# Patient Record
Sex: FEMALE | Race: BLACK OR AFRICAN AMERICAN | NOT HISPANIC OR LATINO | URBAN - METROPOLITAN AREA
[De-identification: names, ages, dates, MRNs, and addresses within clinical notes are randomized per-mention and may not be internally consistent; named-entity substitution may affect disease eponyms.]

---

## 2023-07-23 ENCOUNTER — EMERGENCY (EMERGENCY)
Facility: HOSPITAL | Age: 58
LOS: 1 days | Discharge: HOME | End: 2023-07-23
Attending: STUDENT IN AN ORGANIZED HEALTH CARE EDUCATION/TRAINING PROGRAM | Admitting: STUDENT IN AN ORGANIZED HEALTH CARE EDUCATION/TRAINING PROGRAM
Payer: MEDICAID

## 2023-07-23 VITALS
RESPIRATION RATE: 17 BRPM | HEART RATE: 84 BPM | SYSTOLIC BLOOD PRESSURE: 152 MMHG | OXYGEN SATURATION: 100 % | TEMPERATURE: 98 F | DIASTOLIC BLOOD PRESSURE: 92 MMHG

## 2023-07-23 DIAGNOSIS — Z98.51 TUBAL LIGATION STATUS: Chronic | ICD-10-CM

## 2023-07-23 LAB
A1C WITH ESTIMATED AVERAGE GLUCOSE RESULT: 14.5 % — HIGH (ref 4–5.6)
ALBUMIN SERPL ELPH-MCNC: 4.3 G/DL — SIGNIFICANT CHANGE UP (ref 3.3–5)
ALBUMIN SERPL ELPH-MCNC: 4.7 G/DL — SIGNIFICANT CHANGE UP (ref 3.3–5)
ALP SERPL-CCNC: 107 U/L — SIGNIFICANT CHANGE UP (ref 40–120)
ALP SERPL-CCNC: 136 U/L — HIGH (ref 40–120)
ALT FLD-CCNC: 19 U/L — SIGNIFICANT CHANGE UP (ref 4–33)
ALT FLD-CCNC: 21 U/L — SIGNIFICANT CHANGE UP (ref 4–33)
ANION GAP SERPL CALC-SCNC: 13 MMOL/L — SIGNIFICANT CHANGE UP (ref 7–14)
ANION GAP SERPL CALC-SCNC: 17 MMOL/L — HIGH (ref 7–14)
APPEARANCE UR: CLEAR — SIGNIFICANT CHANGE UP
AST SERPL-CCNC: 22 U/L — SIGNIFICANT CHANGE UP (ref 4–32)
AST SERPL-CCNC: 32 U/L — SIGNIFICANT CHANGE UP (ref 4–32)
B PERT DNA SPEC QL NAA+PROBE: SIGNIFICANT CHANGE UP
B PERT+PARAPERT DNA PNL SPEC NAA+PROBE: SIGNIFICANT CHANGE UP
B-OH-BUTYR SERPL-SCNC: <0 MMOL/L — SIGNIFICANT CHANGE UP (ref 0–0.4)
BASE EXCESS BLDV CALC-SCNC: 7.6 MMOL/L — HIGH (ref -2–3)
BASOPHILS # BLD AUTO: 0 K/UL — SIGNIFICANT CHANGE UP (ref 0–0.2)
BASOPHILS NFR BLD AUTO: 0 % — SIGNIFICANT CHANGE UP (ref 0–2)
BILIRUB SERPL-MCNC: 0.3 MG/DL — SIGNIFICANT CHANGE UP (ref 0.2–1.2)
BILIRUB SERPL-MCNC: 0.4 MG/DL — SIGNIFICANT CHANGE UP (ref 0.2–1.2)
BILIRUB UR-MCNC: NEGATIVE — SIGNIFICANT CHANGE UP
BLOOD GAS VENOUS COMPREHENSIVE RESULT: SIGNIFICANT CHANGE UP
BLOOD GAS VENOUS COMPREHENSIVE RESULT: SIGNIFICANT CHANGE UP
BORDETELLA PARAPERTUSSIS (RAPRVP): SIGNIFICANT CHANGE UP
BUN SERPL-MCNC: 19 MG/DL — SIGNIFICANT CHANGE UP (ref 7–23)
BUN SERPL-MCNC: 21 MG/DL — SIGNIFICANT CHANGE UP (ref 7–23)
C PNEUM DNA SPEC QL NAA+PROBE: SIGNIFICANT CHANGE UP
CALCIUM SERPL-MCNC: 10 MG/DL — SIGNIFICANT CHANGE UP (ref 8.4–10.5)
CALCIUM SERPL-MCNC: 10.9 MG/DL — HIGH (ref 8.4–10.5)
CHLORIDE BLDV-SCNC: 93 MMOL/L — LOW (ref 96–108)
CHLORIDE SERPL-SCNC: 101 MMOL/L — SIGNIFICANT CHANGE UP (ref 98–107)
CHLORIDE SERPL-SCNC: 90 MMOL/L — LOW (ref 98–107)
CO2 BLDV-SCNC: 35.8 MMOL/L — HIGH (ref 22–26)
CO2 SERPL-SCNC: 27 MMOL/L — SIGNIFICANT CHANGE UP (ref 22–31)
CO2 SERPL-SCNC: 27 MMOL/L — SIGNIFICANT CHANGE UP (ref 22–31)
COLOR SPEC: YELLOW — SIGNIFICANT CHANGE UP
CREAT SERPL-MCNC: 0.69 MG/DL — SIGNIFICANT CHANGE UP (ref 0.5–1.3)
CREAT SERPL-MCNC: 0.83 MG/DL — SIGNIFICANT CHANGE UP (ref 0.5–1.3)
DIFF PNL FLD: NEGATIVE — SIGNIFICANT CHANGE UP
EGFR: 101 ML/MIN/1.73M2 — SIGNIFICANT CHANGE UP
EGFR: 82 ML/MIN/1.73M2 — SIGNIFICANT CHANGE UP
EOSINOPHIL # BLD AUTO: 0.11 K/UL — SIGNIFICANT CHANGE UP (ref 0–0.5)
EOSINOPHIL NFR BLD AUTO: 1.7 % — SIGNIFICANT CHANGE UP (ref 0–6)
ESTIMATED AVERAGE GLUCOSE: 369 — SIGNIFICANT CHANGE UP
FLUAV SUBTYP SPEC NAA+PROBE: SIGNIFICANT CHANGE UP
FLUBV RNA SPEC QL NAA+PROBE: SIGNIFICANT CHANGE UP
GAS PNL BLDV: 129 MMOL/L — LOW (ref 136–145)
GAS PNL BLDV: SIGNIFICANT CHANGE UP
GLUCOSE BLDV-MCNC: 573 MG/DL — CRITICAL HIGH (ref 70–99)
GLUCOSE SERPL-MCNC: 181 MG/DL — HIGH (ref 70–99)
GLUCOSE SERPL-MCNC: 528 MG/DL — CRITICAL HIGH (ref 70–99)
GLUCOSE UR QL: >=1000 MG/DL
HADV DNA SPEC QL NAA+PROBE: SIGNIFICANT CHANGE UP
HCO3 BLDV-SCNC: 34 MMOL/L — HIGH (ref 22–29)
HCOV 229E RNA SPEC QL NAA+PROBE: SIGNIFICANT CHANGE UP
HCOV HKU1 RNA SPEC QL NAA+PROBE: SIGNIFICANT CHANGE UP
HCOV NL63 RNA SPEC QL NAA+PROBE: SIGNIFICANT CHANGE UP
HCOV OC43 RNA SPEC QL NAA+PROBE: SIGNIFICANT CHANGE UP
HCT VFR BLD CALC: 43.8 % — SIGNIFICANT CHANGE UP (ref 34.5–45)
HCT VFR BLDA CALC: 38 % — SIGNIFICANT CHANGE UP (ref 34.5–46.5)
HGB BLD CALC-MCNC: 12.7 G/DL — SIGNIFICANT CHANGE UP (ref 11.7–16.1)
HGB BLD-MCNC: 13.2 G/DL — SIGNIFICANT CHANGE UP (ref 11.5–15.5)
HMPV RNA SPEC QL NAA+PROBE: SIGNIFICANT CHANGE UP
HPIV1 RNA SPEC QL NAA+PROBE: SIGNIFICANT CHANGE UP
HPIV2 RNA SPEC QL NAA+PROBE: SIGNIFICANT CHANGE UP
HPIV3 RNA SPEC QL NAA+PROBE: SIGNIFICANT CHANGE UP
HPIV4 RNA SPEC QL NAA+PROBE: SIGNIFICANT CHANGE UP
IANC: 4.14 K/UL — SIGNIFICANT CHANGE UP (ref 1.8–7.4)
KETONES UR-MCNC: NEGATIVE MG/DL — SIGNIFICANT CHANGE UP
LACTATE BLDV-MCNC: 4.1 MMOL/L — CRITICAL HIGH (ref 0.5–2)
LEUKOCYTE ESTERASE UR-ACNC: NEGATIVE — SIGNIFICANT CHANGE UP
LYMPHOCYTES # BLD AUTO: 2.12 K/UL — SIGNIFICANT CHANGE UP (ref 1–3.3)
LYMPHOCYTES # BLD AUTO: 32.2 % — SIGNIFICANT CHANGE UP (ref 13–44)
M PNEUMO DNA SPEC QL NAA+PROBE: SIGNIFICANT CHANGE UP
MCHC RBC-ENTMCNC: 21.3 PG — LOW (ref 27–34)
MCHC RBC-ENTMCNC: 30.1 GM/DL — LOW (ref 32–36)
MCV RBC AUTO: 70.5 FL — LOW (ref 80–100)
MONOCYTES # BLD AUTO: 0.51 K/UL — SIGNIFICANT CHANGE UP (ref 0–0.9)
MONOCYTES NFR BLD AUTO: 7.8 % — SIGNIFICANT CHANGE UP (ref 2–14)
NEUTROPHILS # BLD AUTO: 3.83 K/UL — SIGNIFICANT CHANGE UP (ref 1.8–7.4)
NEUTROPHILS NFR BLD AUTO: 58.3 % — SIGNIFICANT CHANGE UP (ref 43–77)
NITRITE UR-MCNC: NEGATIVE — SIGNIFICANT CHANGE UP
PCO2 BLDV: 55 MMHG — HIGH (ref 39–52)
PH BLDV: 7.4 — SIGNIFICANT CHANGE UP (ref 7.32–7.43)
PH UR: 7 — SIGNIFICANT CHANGE UP (ref 5–8)
PLAT MORPH BLD: NORMAL — SIGNIFICANT CHANGE UP
PLATELET # BLD AUTO: 241 K/UL — SIGNIFICANT CHANGE UP (ref 150–400)
PLATELET COUNT - ESTIMATE: NORMAL — SIGNIFICANT CHANGE UP
PO2 BLDV: 33 MMHG — SIGNIFICANT CHANGE UP (ref 25–45)
POTASSIUM BLDV-SCNC: 7.7 MMOL/L — CRITICAL HIGH (ref 3.5–5.1)
POTASSIUM SERPL-MCNC: 3.5 MMOL/L — SIGNIFICANT CHANGE UP (ref 3.5–5.3)
POTASSIUM SERPL-MCNC: 4.9 MMOL/L — SIGNIFICANT CHANGE UP (ref 3.5–5.3)
POTASSIUM SERPL-SCNC: 3.5 MMOL/L — SIGNIFICANT CHANGE UP (ref 3.5–5.3)
POTASSIUM SERPL-SCNC: 4.9 MMOL/L — SIGNIFICANT CHANGE UP (ref 3.5–5.3)
PROT SERPL-MCNC: 7.8 G/DL — SIGNIFICANT CHANGE UP (ref 6–8.3)
PROT SERPL-MCNC: 8.3 G/DL — SIGNIFICANT CHANGE UP (ref 6–8.3)
PROT UR-MCNC: NEGATIVE MG/DL — SIGNIFICANT CHANGE UP
RAPID RVP RESULT: SIGNIFICANT CHANGE UP
RBC # BLD: 6.21 M/UL — HIGH (ref 3.8–5.2)
RBC # FLD: 14.6 % — HIGH (ref 10.3–14.5)
RBC BLD AUTO: NORMAL — SIGNIFICANT CHANGE UP
RSV RNA SPEC QL NAA+PROBE: SIGNIFICANT CHANGE UP
RV+EV RNA SPEC QL NAA+PROBE: SIGNIFICANT CHANGE UP
SAO2 % BLDV: 50.8 % — LOW (ref 67–88)
SARS-COV-2 RNA SPEC QL NAA+PROBE: SIGNIFICANT CHANGE UP
SODIUM SERPL-SCNC: 134 MMOL/L — LOW (ref 135–145)
SODIUM SERPL-SCNC: 141 MMOL/L — SIGNIFICANT CHANGE UP (ref 135–145)
SP GR SPEC: 1.03 — HIGH (ref 1–1.03)
TROPONIN T, HIGH SENSITIVITY RESULT: 11 NG/L — SIGNIFICANT CHANGE UP
UROBILINOGEN FLD QL: 0.2 MG/DL — SIGNIFICANT CHANGE UP (ref 0.2–1)
WBC # BLD: 6.57 K/UL — SIGNIFICANT CHANGE UP (ref 3.8–10.5)
WBC # FLD AUTO: 6.57 K/UL — SIGNIFICANT CHANGE UP (ref 3.8–10.5)

## 2023-07-23 PROCEDURE — 71046 X-RAY EXAM CHEST 2 VIEWS: CPT | Mod: 26

## 2023-07-23 PROCEDURE — 93010 ELECTROCARDIOGRAM REPORT: CPT

## 2023-07-23 PROCEDURE — 99223 1ST HOSP IP/OBS HIGH 75: CPT

## 2023-07-23 RX ORDER — SODIUM CHLORIDE 9 MG/ML
1000 INJECTION INTRAMUSCULAR; INTRAVENOUS; SUBCUTANEOUS ONCE
Refills: 0 | Status: COMPLETED | OUTPATIENT
Start: 2023-07-23 | End: 2023-07-23

## 2023-07-23 RX ORDER — METOPROLOL TARTRATE 50 MG
25 TABLET ORAL DAILY
Refills: 0 | Status: DISCONTINUED | OUTPATIENT
Start: 2023-07-23 | End: 2023-07-27

## 2023-07-23 RX ORDER — INSULIN GLARGINE 100 [IU]/ML
18 INJECTION, SOLUTION SUBCUTANEOUS AT BEDTIME
Refills: 0 | Status: DISCONTINUED | OUTPATIENT
Start: 2023-07-23 | End: 2023-07-27

## 2023-07-23 RX ORDER — DEXTROSE 50 % IN WATER 50 %
25 SYRINGE (ML) INTRAVENOUS ONCE
Refills: 0 | Status: DISCONTINUED | OUTPATIENT
Start: 2023-07-23 | End: 2023-07-27

## 2023-07-23 RX ORDER — INSULIN LISPRO 100/ML
VIAL (ML) SUBCUTANEOUS
Refills: 0 | Status: DISCONTINUED | OUTPATIENT
Start: 2023-07-23 | End: 2023-07-27

## 2023-07-23 RX ORDER — DEXTROSE 50 % IN WATER 50 %
15 SYRINGE (ML) INTRAVENOUS ONCE
Refills: 0 | Status: DISCONTINUED | OUTPATIENT
Start: 2023-07-23 | End: 2023-07-27

## 2023-07-23 RX ORDER — DEXTROSE 50 % IN WATER 50 %
12.5 SYRINGE (ML) INTRAVENOUS ONCE
Refills: 0 | Status: DISCONTINUED | OUTPATIENT
Start: 2023-07-23 | End: 2023-07-27

## 2023-07-23 RX ORDER — SODIUM CHLORIDE 9 MG/ML
1000 INJECTION, SOLUTION INTRAVENOUS
Refills: 0 | Status: DISCONTINUED | OUTPATIENT
Start: 2023-07-23 | End: 2023-07-27

## 2023-07-23 RX ORDER — GLUCAGON INJECTION, SOLUTION 0.5 MG/.1ML
1 INJECTION, SOLUTION SUBCUTANEOUS ONCE
Refills: 0 | Status: DISCONTINUED | OUTPATIENT
Start: 2023-07-23 | End: 2023-07-27

## 2023-07-23 RX ORDER — INSULIN LISPRO 100/ML
VIAL (ML) SUBCUTANEOUS AT BEDTIME
Refills: 0 | Status: DISCONTINUED | OUTPATIENT
Start: 2023-07-23 | End: 2023-07-27

## 2023-07-23 RX ORDER — INSULIN LISPRO 100/ML
6 VIAL (ML) SUBCUTANEOUS
Refills: 0 | Status: DISCONTINUED | OUTPATIENT
Start: 2023-07-23 | End: 2023-07-27

## 2023-07-23 RX ORDER — INSULIN LISPRO 100/ML
6 VIAL (ML) SUBCUTANEOUS ONCE
Refills: 0 | Status: COMPLETED | OUTPATIENT
Start: 2023-07-23 | End: 2023-07-23

## 2023-07-23 RX ADMIN — Medication 6 UNIT(S): at 18:29

## 2023-07-23 RX ADMIN — SODIUM CHLORIDE 1000 MILLILITER(S): 9 INJECTION INTRAMUSCULAR; INTRAVENOUS; SUBCUTANEOUS at 16:58

## 2023-07-23 RX ADMIN — SODIUM CHLORIDE 1000 MILLILITER(S): 9 INJECTION INTRAMUSCULAR; INTRAVENOUS; SUBCUTANEOUS at 18:28

## 2023-07-23 RX ADMIN — INSULIN GLARGINE 18 UNIT(S): 100 INJECTION, SOLUTION SUBCUTANEOUS at 22:17

## 2023-07-23 NOTE — ED PROVIDER NOTE - ATTENDING CONTRIBUTION TO CARE
57-year-old female, past medical history of IDDM, hypertension, hyperlipidemia, and hypothyroidism, presents to ED complaining of generalized weakness and fatigue x1 week, that has worsened over the last 3 days.  Patient reports that she had not been taking her diabetes medications because she felt unwell.  Patient also noted some vaginal irritation, was seen by PCP and diagnosed with yeast infection, started on medications yesterday.  Patient fingersticks was in the 400s which prompted ED visit.  Patient endorses urinary frequency.  Denies fever/chills, chest pain, shortness of breath, abdominal pain, nausea/vomiting/diarrhea, weakness/numbness, lightheadedness/dizziness or any other symptoms at this time.    VSS. Physical exam unremarkable. Lungs CTAB. No abd ttp. Heart RRR. Neuro exam unremarkable. EKG is NSR w/ TWI in V2 (no prior).     DDx includes DKA vs. ACS vs. Electrolyte derangement vs. PNA vs. UTI.    Plan to check basic labs, trop, VBG, BHB, CXR, and UA. IVF for relief. Reassess for dispo.

## 2023-07-23 NOTE — ED CDU PROVIDER INITIAL DAY NOTE - CLINICAL SUMMARY MEDICAL DECISION MAKING FREE TEXT BOX
57-year-old female with past medical history of diabetes on insulin, hypertension, hyperlipidemia, presents to ED complaining of hyperglycemia and generalized weakness for 1 week.  Patient states she started feeling unwell a week ago and has not checked her sugar in a very long time.  Patient has a device on her abdomen that tracks her sugar and placed it today and noted to have sugars in the 400s so came to the ER. no signs of dka. sent to cdu for endo consult. hga1c of 14.5. plan to repeat labs and lactate s/p fluids. started on weight based insulin

## 2023-07-23 NOTE — ED PROVIDER NOTE - OBJECTIVE STATEMENT
Pt is a 57 y F w/ a PMHx of diabetes (on basaglar, metformin, and pioglitazone), HTN, HLD, and hyperthyroidism who comes in with complaints of weakness for the past week. Pt reports that weakness and fatigue worsened over the past 3 days. Pt reports that she did not take her diabetes medication bc she felt unwell; FS was in 400s. Pt denies chest pain, SOB, abdominal pain, nausea, vomiting, diarrhea, constipation. Pt complains of vaginal itching- was seen by PMD yesterday and diagnosed with yeast infection, was prescribed cream.

## 2023-07-23 NOTE — ED PROVIDER NOTE - CLINICAL SUMMARY MEDICAL DECISION MAKING FREE TEXT BOX
Pt is a 57 y F w/ a PMHx of diabetes (on basaglar, metformin, and pioglitazone), HTN, HLD, and hyperthyroidism who comes in with complaints of weakness for the past week, did not take diabetes medications over the past several days. Found to have FS glucose on 400s. Will order cbc, cmp, beta-hydroxybutyrate, and UA.

## 2023-07-23 NOTE — ED PROVIDER NOTE - PROGRESS NOTE DETAILS
Glucose 528 on BMP, slightly elevated anion gap of 17. K was 4.9 on CMP. Ordered 6 units of admelog and an additional 1L bolus. Will repeat BMP after fluids. UA results pending. Glucose 528 on BMP, slightly elevated anion gap of 17. K was 4.9 on CMP. Ordered 6 units of admelog and an additional 1L bolus. Will repeat BMP after fluids. UA results pending, A1c of 14.5.

## 2023-07-23 NOTE — ED PROVIDER NOTE - NSICDXPASTMEDICALHX_GEN_ALL_CORE_FT
PAST MEDICAL HISTORY:  Diabetes     HLD (hyperlipidemia)     HTN (hypertension)     Hyperthyroidism

## 2023-07-23 NOTE — ED CDU PROVIDER INITIAL DAY NOTE - OBJECTIVE STATEMENT
57-year-old female with past medical history of diabetes on insulin, hypertension, hyperlipidemia, presents to ED complaining of hyperglycemia and generalized weakness for 1 week.  Patient states she started feeling unwell a week ago and has not checked her sugar in a very long time.  Patient has a device on her abdomen that tracks her sugar and placed it today and noted to have sugars in the 400s so came to the ER.  Patient normally follows up with primary care provider for diabetes.  Patient denies fevers, chills, cough, chest pain, abdominal pain, nausea vomiting, dysuria.

## 2023-07-23 NOTE — ED PROVIDER NOTE - PHYSICAL EXAMINATION
Vital Signs Last 24 Hrs  T(C): 36.9 (23 Jul 2023 15:48), Max: 36.9 (23 Jul 2023 15:48)  T(F): 98.5 (23 Jul 2023 15:48), Max: 98.5 (23 Jul 2023 15:48)  HR: 84 (23 Jul 2023 15:48) (84 - 84)  BP: 152/92 (23 Jul 2023 15:48) (152/92 - 152/92)  BP(mean): --  RR: 17 (23 Jul 2023 15:48) (17 - 17)  SpO2: 100% (23 Jul 2023 15:48) (100% - 100%)    Parameters below as of 23 Jul 2023 15:48  Patient On (Oxygen Delivery Method): room air        CONSTITUTIONAL: Well-groomed, in no apparent distress  EYES: No conjunctival or scleral injection, non-icteric   ENMT: No external nasal lesions; MMM  RESPIRATORY: Breathing comfortably; no dullness to percussion; lungs CTA without wheeze/rhonchi/rales  CARDIOVASCULAR: +S1S2, RRR, no M/G/R; pedal pulses full and symmetric; no lower extremity edema  GASTROINTESTINAL: No palpable masses or tenderness, +BS throughout, no rebound/guarding  LYMPHATIC: No cervical LAD or tenderness  SKIN: No rashes or ulcers noted  NEUROLOGIC: CN II-XII nonfocal, sensation intact in LEs b/l to light touch  PSYCHIATRIC: A+O x 3; mood and affect appropriate; appropriate insight and judgment

## 2023-07-23 NOTE — ED ADULT TRIAGE NOTE - CHIEF COMPLAINT QUOTE
Pt c/o elevated blood sugar in the 400's today. c/o generalized weakness, dizziness x 3 days. denies any chest pain, sob, headache, n/v. also reports elevated blood pressure 130/93, took meds today. pt in no distress. . hx. DM, HTN

## 2023-07-23 NOTE — ED CDU PROVIDER INITIAL DAY NOTE - NS ED ATTENDING STATEMENT MOD
This was a shared visit with the [facility]. I reviewed and verified the documentation and independently performed the documented:

## 2023-07-24 VITALS
SYSTOLIC BLOOD PRESSURE: 128 MMHG | DIASTOLIC BLOOD PRESSURE: 83 MMHG | TEMPERATURE: 99 F | HEART RATE: 84 BPM | RESPIRATION RATE: 16 BRPM

## 2023-07-24 DIAGNOSIS — E11.9 TYPE 2 DIABETES MELLITUS WITHOUT COMPLICATIONS: ICD-10-CM

## 2023-07-24 DIAGNOSIS — E05.90 THYROTOXICOSIS, UNSPECIFIED WITHOUT THYROTOXIC CRISIS OR STORM: ICD-10-CM

## 2023-07-24 DIAGNOSIS — E78.5 HYPERLIPIDEMIA, UNSPECIFIED: ICD-10-CM

## 2023-07-24 DIAGNOSIS — I10 ESSENTIAL (PRIMARY) HYPERTENSION: ICD-10-CM

## 2023-07-24 LAB
BLOOD GAS VENOUS COMPREHENSIVE RESULT: SIGNIFICANT CHANGE UP
TSH SERPL-MCNC: <0.1 UIU/ML — LOW (ref 0.27–4.2)

## 2023-07-24 PROCEDURE — 99239 HOSP IP/OBS DSCHRG MGMT >30: CPT

## 2023-07-24 PROCEDURE — 99205 OFFICE O/P NEW HI 60 MIN: CPT | Mod: GC

## 2023-07-24 RX ORDER — ISOPROPYL ALCOHOL, BENZOCAINE .7; .06 ML/ML; ML/ML
0 SWAB TOPICAL
Qty: 100 | Refills: 1
Start: 2023-07-24

## 2023-07-24 RX ORDER — INSULIN LISPRO 100/ML
6 VIAL (ML) SUBCUTANEOUS
Qty: 2 | Refills: 0
Start: 2023-07-24 | End: 2023-08-22

## 2023-07-24 RX ORDER — METFORMIN HYDROCHLORIDE 850 MG/1
1 TABLET ORAL
Qty: 60 | Refills: 0
Start: 2023-07-24 | End: 2023-08-22

## 2023-07-24 RX ORDER — INSULIN ASPART 100 [IU]/ML
6 INJECTION, SOLUTION SUBCUTANEOUS
Qty: 2 | Refills: 0
Start: 2023-07-24 | End: 2023-08-22

## 2023-07-24 RX ORDER — INSULIN GLARGINE 100 [IU]/ML
22 INJECTION, SOLUTION SUBCUTANEOUS
Qty: 3 | Refills: 0
Start: 2023-07-24 | End: 2023-08-22

## 2023-07-24 RX ORDER — SODIUM CHLORIDE 9 MG/ML
1000 INJECTION INTRAMUSCULAR; INTRAVENOUS; SUBCUTANEOUS ONCE
Refills: 0 | Status: COMPLETED | OUTPATIENT
Start: 2023-07-24 | End: 2023-07-24

## 2023-07-24 RX ADMIN — Medication 6 UNIT(S): at 11:53

## 2023-07-24 RX ADMIN — Medication 6 UNIT(S): at 08:26

## 2023-07-24 RX ADMIN — SODIUM CHLORIDE 1000 MILLILITER(S): 9 INJECTION INTRAMUSCULAR; INTRAVENOUS; SUBCUTANEOUS at 04:30

## 2023-07-24 RX ADMIN — Medication 25 MILLIGRAM(S): at 05:17

## 2023-07-24 RX ADMIN — Medication 2: at 08:27

## 2023-07-24 RX ADMIN — Medication 2: at 11:52

## 2023-07-24 NOTE — CONSULT NOTE ADULT - SUBJECTIVE AND OBJECTIVE BOX
HPI: Pt is a 57 y F w/ a PMHx of diabetes (on basaglar, metformin, and pioglitazone), HTN, HLD, and hyperthyroidism who comes in with complaints of weakness for the past week. Pt reports that weakness and fatigue worsened over the past 3 days. Pt reports that she did not take her diabetes medication bc she felt unwell; FS was in 400s. Pt denies chest pain, SOB, abdominal pain, nausea, vomiting, diarrhea, constipation. Pt complains of vaginal itching- was seen by PMD yesterday and diagnosed with yeast infection, was prescribed cream.    Diabetes Hx:  History/diagnosis: T2DM, diagnosed 4-5 years ago  Follows with: PCP Dr. Acevedo  Last hemoglobin A1c: 14.5% (this admission)  Home regimen: Lantus 10 units qhs; pioglitazone 30mg qd; metformin 500mg BID  Adherence: generally good, but stopped taking all meds 1 week ago when she started feeling sick and has had a lot of family stress  Previous meds: insurance did not cover farxiga  Blood sugar: has a DEXCOM, no data to review. Generally glucose mid-100's in the morning and 300s post-prandially  Hypoglycemia episodes: none  Diet/lifestyle: eats a lot of [place] food with vegetables, rice, and boiled green bananas. Drinks "green smoothies" and cranberry juice   Symptoms: weakness, polydipsia, polyuria  Microvascular complications: Has neuropathy. No known nephropathy or retinopathy, dose see an eye doctor.  Macrovascular complications: no hx CAD or CVA    Patient also has a hx of hyperthyroidism, diagnosed 4-5 years ago. She does not known the cause but states her daughter has a similar issue. She is managed by her PCP. She did not tolerate methimazole due to "feeling itchy" so was switched to PTU years ago. Currently taking PTU 50mg BID. Complains of her throat sometimes "looking puffy" and has symptoms of heat intolerance, "hot flashes," and 10 pound weight loss in the past 1 month. Feels her heart beating after walking up the stairs but otherwise no palpitations.       PAST MEDICAL & SURGICAL HISTORY:  HTN (hypertension)      HLD (hyperlipidemia)      Diabetes      Hyperthyroidism      S/P tubal ligation          FAMILY HISTORY:  FH: diabetes mellitus (Father)        Social History:    Outpatient Medications:    MEDICATIONS  (STANDING):  clotrimazole 2% Vaginal Cream 1 Applicatorful Vaginal at bedtime  dextrose 5%. 1000 milliLiter(s) (50 mL/Hr) IV Continuous <Continuous>  dextrose 5%. 1000 milliLiter(s) (100 mL/Hr) IV Continuous <Continuous>  dextrose 50% Injectable 25 Gram(s) IV Push once  dextrose 50% Injectable 12.5 Gram(s) IV Push once  dextrose 50% Injectable 25 Gram(s) IV Push once  glucagon  Injectable 1 milliGRAM(s) IntraMuscular once  hydrochlorothiazide 50 milliGRAM(s) Oral daily  insulin glargine Injectable (LANTUS) 18 Unit(s) SubCutaneous at bedtime  insulin lispro (ADMELOG) corrective regimen sliding scale   SubCutaneous three times a day before meals  insulin lispro (ADMELOG) corrective regimen sliding scale   SubCutaneous at bedtime  insulin lispro Injectable (ADMELOG) 6 Unit(s) SubCutaneous three times a day before meals  metoprolol succinate ER 25 milliGRAM(s) Oral daily    MEDICATIONS  (PRN):  dextrose Oral Gel 15 Gram(s) Oral once PRN Blood Glucose LESS THAN 70 milliGRAM(s)/deciliter      Allergies    lisinopril (Rash)    Intolerances      Review of Systems:  Constitutional: No fever  Eyes: No blurry vision  Neuro: No tremors  HEENT: No pain  Cardiovascular: No chest pain, palpitations  Respiratory: No SOB, no cough  GI: No nausea, vomiting, abdominal pain  : No dysuria  Skin: no rash  Psych: no depression  Endocrine: + polyuria, polydipsia  Hem/lymph: no swelling  Osteoporosis: no fractures    ALL OTHER SYSTEMS REVIEWED AND NEGATIVE      PHYSICAL EXAM:  VITALS: T(C): 36.6 (07-24-23 @ 10:12)  T(F): 97.8 (07-24-23 @ 10:12), Max: 99.2 (07-23-23 @ 17:01)  HR: 71 (07-24-23 @ 10:12) (67 - 84)  BP: 137/67 (07-24-23 @ 10:12) (99/42 - 152/92)  RR:  (16 - 18)  SpO2:  (100% - 100%)  Wt(kg): --  GENERAL: NAD, well-groomed, well-developed  EYES: No proptosis, no lid lag, anicteric  HEENT:  Atraumatic, Normocephalic, moist mucous membranes  THYROID: Normal size, no palpable nodules  RESPIRATORY: Clear to auscultation bilaterally; No rales, rhonchi, wheezing  SKIN: Dry, intact, No rashes or lesions  MUSCULOSKELETAL: Full range of motion, normal strength  NEURO: sensation intact, extraocular movements intact, no tremor  PSYCH: Alert and oriented x 3, normal affect, normal mood  CUSHING'S SIGNS: no striae      CAPILLARY BLOOD GLUCOSE      POCT Blood Glucose.: 236 mg/dL (24 Jul 2023 11:26)  POCT Blood Glucose.: 211 mg/dL (24 Jul 2023 08:04)  POCT Blood Glucose.: 171 mg/dL (23 Jul 2023 21:32)  POCT Blood Glucose.: 361 mg/dL (23 Jul 2023 19:40)  POCT Blood Glucose.: 488 mg/dL (23 Jul 2023 15:54)                            13.2   6.57  )-----------( 241      ( 23 Jul 2023 17:01 )             43.8       07-23    141  |  101  |  19  ----------------------------<  181<H>  3.5   |  27  |  0.69    eGFR: 101    Ca    10.0      07-23  Mg     2.10     07-23  Phos  3.1     07-23    TPro  7.8  /  Alb  4.3  /  TBili  0.4  /  DBili  x   /  AST  22  /  ALT  19  /  AlkPhos  107  07-23      Thyroid Function Tests:      A1C with Estimated Average Glucose Result: 14.5 % (07-23-23 @ 17:01)          Radiology:                HPI: Pt is a 57 y F w/ a PMHx of diabetes (on basaglar, metformin, and pioglitazone), HTN, HLD, and hyperthyroidism who comes in with complaints of weakness for the past week. Pt reports that weakness and fatigue worsened over the past 3 days. Pt reports that she did not take her diabetes medication bc she felt unwell; FS was in 400s. Pt denies chest pain, SOB, abdominal pain, nausea, vomiting, diarrhea, constipation. Pt complains of vaginal itching- was seen by PMD yesterday and diagnosed with yeast infection, was prescribed cream.    Diabetes Hx:  History/diagnosis: T2DM, diagnosed 4-5 years ago  Follows with: PCP Dr. Acevedo  Last hemoglobin A1c: 14.5% (this admission)  Home regimen: Lantus 10 units qhs; pioglitazone 30mg qd; metformin 500mg BID  Adherence: generally good, but stopped taking all meds 1 week ago when she started feeling sick and has had a lot of family stress  Previous meds: insurance did not cover farxiga  Blood sugar: has a DEXCOM, no data to review. Generally glucose mid-100's in the morning and 300s post-prandially  Hypoglycemia episodes: none  Diet/lifestyle: eats a lot of [employer] food with vegetables, rice, and boiled green bananas. Drinks "green smoothies" and cranberry juice   Symptoms: weakness, polydipsia, polyuria  Microvascular complications: Has neuropathy. No known nephropathy or retinopathy, dose see an eye doctor.  Macrovascular complications: no hx CAD or CVA    Patient also has a hx of hyperthyroidism, diagnosed 4-5 years ago. She does not known the cause but states her daughter has a similar issue. She is managed by her PCP. She did not tolerate methimazole due to "feeling itchy" so was switched to PTU years ago. Currently taking PTU 50mg qd, although Surescripts states her dose should be 50mg BID. Complains of her throat sometimes "looking puffy" and has symptoms of heat intolerance, "hot flashes," and 10 pound weight loss in the past 1 month. Feels her heart beating after walking up the stairs but otherwise no palpitations.       PAST MEDICAL & SURGICAL HISTORY:  HTN (hypertension)      HLD (hyperlipidemia)      Diabetes      Hyperthyroidism      S/P tubal ligation          FAMILY HISTORY:  FH: diabetes mellitus (Father)        Social History:    Outpatient Medications:    MEDICATIONS  (STANDING):  clotrimazole 2% Vaginal Cream 1 Applicatorful Vaginal at bedtime  dextrose 5%. 1000 milliLiter(s) (50 mL/Hr) IV Continuous <Continuous>  dextrose 5%. 1000 milliLiter(s) (100 mL/Hr) IV Continuous <Continuous>  dextrose 50% Injectable 25 Gram(s) IV Push once  dextrose 50% Injectable 12.5 Gram(s) IV Push once  dextrose 50% Injectable 25 Gram(s) IV Push once  glucagon  Injectable 1 milliGRAM(s) IntraMuscular once  hydrochlorothiazide 50 milliGRAM(s) Oral daily  insulin glargine Injectable (LANTUS) 18 Unit(s) SubCutaneous at bedtime  insulin lispro (ADMELOG) corrective regimen sliding scale   SubCutaneous three times a day before meals  insulin lispro (ADMELOG) corrective regimen sliding scale   SubCutaneous at bedtime  insulin lispro Injectable (ADMELOG) 6 Unit(s) SubCutaneous three times a day before meals  metoprolol succinate ER 25 milliGRAM(s) Oral daily    MEDICATIONS  (PRN):  dextrose Oral Gel 15 Gram(s) Oral once PRN Blood Glucose LESS THAN 70 milliGRAM(s)/deciliter      Allergies    lisinopril (Rash)    Intolerances      Review of Systems:  Constitutional: No fever  Eyes: No blurry vision  Neuro: No tremors  HEENT: No pain  Cardiovascular: No chest pain, palpitations  Respiratory: No SOB, no cough  GI: No nausea, vomiting, abdominal pain  : No dysuria  Skin: no rash  Psych: no depression  Endocrine: + polyuria, polydipsia  Hem/lymph: no swelling  Osteoporosis: no fractures    ALL OTHER SYSTEMS REVIEWED AND NEGATIVE      PHYSICAL EXAM:  VITALS: T(C): 36.6 (07-24-23 @ 10:12)  T(F): 97.8 (07-24-23 @ 10:12), Max: 99.2 (07-23-23 @ 17:01)  HR: 71 (07-24-23 @ 10:12) (67 - 84)  BP: 137/67 (07-24-23 @ 10:12) (99/42 - 152/92)  RR:  (16 - 18)  SpO2:  (100% - 100%)  Wt(kg): --  GENERAL: NAD, well-groomed, well-developed  EYES: No proptosis, no lid lag, anicteric  HEENT:  Atraumatic, Normocephalic, moist mucous membranes  THYROID: Normal size, no palpable nodules  RESPIRATORY: Clear to auscultation bilaterally; No rales, rhonchi, wheezing  SKIN: Dry, intact, No rashes or lesions  MUSCULOSKELETAL: Full range of motion, normal strength  NEURO: sensation intact, extraocular movements intact, no tremor  PSYCH: Alert and oriented x 3, normal affect, normal mood  CUSHING'S SIGNS: no striae      CAPILLARY BLOOD GLUCOSE      POCT Blood Glucose.: 236 mg/dL (24 Jul 2023 11:26)  POCT Blood Glucose.: 211 mg/dL (24 Jul 2023 08:04)  POCT Blood Glucose.: 171 mg/dL (23 Jul 2023 21:32)  POCT Blood Glucose.: 361 mg/dL (23 Jul 2023 19:40)  POCT Blood Glucose.: 488 mg/dL (23 Jul 2023 15:54)                            13.2   6.57  )-----------( 241      ( 23 Jul 2023 17:01 )             43.8       07-23    141  |  101  |  19  ----------------------------<  181<H>  3.5   |  27  |  0.69    eGFR: 101    Ca    10.0      07-23  Mg     2.10     07-23  Phos  3.1     07-23    TPro  7.8  /  Alb  4.3  /  TBili  0.4  /  DBili  x   /  AST  22  /  ALT  19  /  AlkPhos  107  07-23      Thyroid Function Tests:      A1C with Estimated Average Glucose Result: 14.5 % (07-23-23 @ 17:01)          Radiology:                HPI: Pt is a 57 y F w/ a PMHx of diabetes (on basaglar, metformin, and pioglitazone), HTN, HLD, and hyperthyroidism who comes in with complaints of weakness for the past week. Pt reports that weakness and fatigue worsened over the past 3 days. Pt reports that she did not take her diabetes medication bc she felt unwell; FS was in 400s. Pt denies chest pain, SOB, abdominal pain, nausea, vomiting, diarrhea, constipation. Pt complains of vaginal itching- was seen by PMD yesterday and diagnosed with yeast infection, was prescribed cream.    Diabetes Hx:  History/diagnosis: T2DM, diagnosed 4-5 years ago  Follows with: PCP Dr. Acevedo  Last hemoglobin A1c: 14.5% (this admission)  Home regimen: Lantus 10 units qhs; pioglitazone 30mg qd; metformin 500mg BID  Adherence: generally good, but stopped taking all meds 1 week ago when she started feeling sick and has had a lot of family stress  Previous meds: insurance did not cover farxiga  Blood sugar: has a DEXCOM, no data to review. Generally glucose mid-100's in the morning and 300s post-prandially  Hypoglycemia episodes: none  Diet/lifestyle: eats a lot of [employer] food with vegetables, rice, and boiled green bananas. Drinks "green smoothies" and cranberry juice   Symptoms: weakness, polydipsia, polyuria  Microvascular complications: Has neuropathy. No known nephropathy or retinopathy, does see an eye doctor yearly  Macrovascular complications: no hx CAD or CVA    Patient also has a hx of hyperthyroidism, diagnosed 4-5 years ago. She does not known the cause but states her daughter has a similar issue. She is managed by her PCP. She did not tolerate methimazole due to "feeling itchy" so was switched to PTU years ago. Currently taking PTU 50mg qd, although Surescripts states her dose should be 50mg BID. Complains of her throat sometimes "looking puffy" and has symptoms of heat intolerance, "hot flashes," and 10 pound weight loss in the past 1 month. Feels her heart beating after walking up the stairs but otherwise no palpitations.       PAST MEDICAL & SURGICAL HISTORY:  HTN (hypertension)      HLD (hyperlipidemia)      Diabetes      Hyperthyroidism      S/P tubal ligation          FAMILY HISTORY:  FH: diabetes mellitus (Father)        Social History:    Outpatient Medications:    MEDICATIONS  (STANDING):  clotrimazole 2% Vaginal Cream 1 Applicatorful Vaginal at bedtime  dextrose 5%. 1000 milliLiter(s) (50 mL/Hr) IV Continuous <Continuous>  dextrose 5%. 1000 milliLiter(s) (100 mL/Hr) IV Continuous <Continuous>  dextrose 50% Injectable 25 Gram(s) IV Push once  dextrose 50% Injectable 12.5 Gram(s) IV Push once  dextrose 50% Injectable 25 Gram(s) IV Push once  glucagon  Injectable 1 milliGRAM(s) IntraMuscular once  hydrochlorothiazide 50 milliGRAM(s) Oral daily  insulin glargine Injectable (LANTUS) 18 Unit(s) SubCutaneous at bedtime  insulin lispro (ADMELOG) corrective regimen sliding scale   SubCutaneous three times a day before meals  insulin lispro (ADMELOG) corrective regimen sliding scale   SubCutaneous at bedtime  insulin lispro Injectable (ADMELOG) 6 Unit(s) SubCutaneous three times a day before meals  metoprolol succinate ER 25 milliGRAM(s) Oral daily    MEDICATIONS  (PRN):  dextrose Oral Gel 15 Gram(s) Oral once PRN Blood Glucose LESS THAN 70 milliGRAM(s)/deciliter      Allergies    lisinopril (Rash)    Intolerances      Review of Systems:  Constitutional: No fever  Eyes: No blurry vision  Neuro: No tremors  HEENT: No pain  Cardiovascular: No chest pain, palpitations  Respiratory: No SOB, no cough  GI: No nausea, vomiting, abdominal pain  : No dysuria  Skin: no rash  Psych: no depression  Endocrine: + polyuria, polydipsia  Hem/lymph: no swelling  Osteoporosis: no fractures    ALL OTHER SYSTEMS REVIEWED AND NEGATIVE      PHYSICAL EXAM:  VITALS: T(C): 36.6 (07-24-23 @ 10:12)  T(F): 97.8 (07-24-23 @ 10:12), Max: 99.2 (07-23-23 @ 17:01)  HR: 71 (07-24-23 @ 10:12) (67 - 84)  BP: 137/67 (07-24-23 @ 10:12) (99/42 - 152/92)  RR:  (16 - 18)  SpO2:  (100% - 100%)  Wt(kg): --  GENERAL: NAD, well-groomed, well-developed  EYES: No proptosis, no lid lag, anicteric  HEENT:  Atraumatic, Normocephalic, moist mucous membranes  THYROID: Normal size, no palpable nodules  RESPIRATORY: Clear to auscultation bilaterally; No rales, rhonchi, wheezing  SKIN: Dry, intact, No rashes or lesions  MUSCULOSKELETAL: Full range of motion, normal strength  NEURO: sensation intact, extraocular movements intact, no tremor  PSYCH: Alert and oriented x 3, normal affect, normal mood  CUSHING'S SIGNS: no striae      CAPILLARY BLOOD GLUCOSE      POCT Blood Glucose.: 236 mg/dL (24 Jul 2023 11:26)  POCT Blood Glucose.: 211 mg/dL (24 Jul 2023 08:04)  POCT Blood Glucose.: 171 mg/dL (23 Jul 2023 21:32)  POCT Blood Glucose.: 361 mg/dL (23 Jul 2023 19:40)  POCT Blood Glucose.: 488 mg/dL (23 Jul 2023 15:54)                            13.2   6.57  )-----------( 241      ( 23 Jul 2023 17:01 )             43.8       07-23    141  |  101  |  19  ----------------------------<  181<H>  3.5   |  27  |  0.69    eGFR: 101    Ca    10.0      07-23  Mg     2.10     07-23  Phos  3.1     07-23    TPro  7.8  /  Alb  4.3  /  TBili  0.4  /  DBili  x   /  AST  22  /  ALT  19  /  AlkPhos  107  07-23      Thyroid Function Tests:      A1C with Estimated Average Glucose Result: 14.5 % (07-23-23 @ 17:01)          Radiology:

## 2023-07-24 NOTE — CONSULT NOTE ADULT - ASSESSMENT
57 y F w/ a PMHx of T2DM, HTN, HLD, and hyperthyroidism, presenting to the ED w/ weakness. Endocrinology consulted for uncontrolled T2DM.    #Uncontrolled Type 2 Diabetes Mellitus   - Hemoglobin A1c: 14.5%  - home regimen: Lantus 10 units qhs; pioglitazone 30mg qd; metformin 500mg BID  - no evidence of DKA on labs  - c/w lantus 18 units QHS   - c/w admelog 6 units before meals   - Continue low dose lispro correction scale TIDQAC and separate low dose scale QHS  - Please check FSG before meals and QHS, or q6h while NPO  - Inpatient glucose goals: <140 pre-meal, <180 random  - consistent carb diet when eating  - nutrition consult placed    Discharge planning:  - Discharge recs pending - will update today  - will need Endocrinology follow up.     #Hyperthyroidism  - c/w home dose PTU 50mg BID  - check TSH and FT4     #Hypertension  - Goal BP <130/80  - on HCTZ and metoprolol  - Management as per primary team    #Hyperlipidemia  - LDL goal <100  - would benefit from moderate dose statin    Higinio Landrum MD  Fellow, PGY-4  Department of Endocrinology, Diabetes and Metabolism     57 y F w/ a PMHx of T2DM, HTN, HLD, and hyperthyroidism, presenting to the ED w/ weakness. Endocrinology consulted for uncontrolled T2DM.    #Uncontrolled Type 2 Diabetes Mellitus   - Hemoglobin A1c: 14.5%  - home regimen: Lantus 10 units qhs; pioglitazone 30mg qd; metformin 500mg BID  - no evidence of DKA on labs  - c/w lantus 18 units QHS   - c/w admelog 6 units before meals   - Continue low dose lispro correction scale TIDQAC and separate low dose scale QHS  - Please check FSG before meals and QHS, or q6h while NPO  - Inpatient glucose goals: <140 pre-meal, <180 random  - consistent carb diet when eating  - nutrition consult placed    Discharge planning:  - Discharge recs pending - will update today  - will need Endocrinology follow up.     #Hyperthyroidism  - c/w home dose PTU 50mg BID  - outpatient followup for repeat TSH and free T4    #Hypertension  - Goal BP <130/80  - on HCTZ and metoprolol  - Management as per primary team    #Hyperlipidemia  - LDL goal <100  - would benefit from moderate dose statin    Higinio Landrum MD  Fellow, PGY-4  Department of Endocrinology, Diabetes and Metabolism     57 y F w/ a PMHx of T2DM, HTN, HLD, and hyperthyroidism, presenting to the ED w/ weakness. Endocrinology consulted for uncontrolled T2DM.    #Uncontrolled Type 2 Diabetes Mellitus   - Hemoglobin A1c: 14.5%  - home regimen: Lantus 10 units qhs; pioglitazone 30mg qd; metformin 500mg BID  - no evidence of DKA on labs  - c/w lantus 18 units QHS   - c/w admelog 6 units before meals   - Continue low dose lispro correction scale TIDQAC and separate low dose scale QHS  - Please check FSG before meals and QHS, or q6h while NPO  - Inpatient glucose goals: <140 pre-meal, <180 random  - consistent carb diet when eating  - nutrition consult placed    Discharge planning:  - Discharge recs pending - will update today  - will need Endocrinology follow up.     #Hyperthyroidism  - liver enzymes wnl  - c/w home dose PTU 50mg BID  - pt needs close outpatient followup for repeat TSH and free T4    #Hypertension  - Goal BP <130/80  - on HCTZ and metoprolol  - Management as per primary team    #Hyperlipidemia  - LDL goal <100  - would benefit from moderate dose statin    Higinio Landrum MD  Fellow, PGY-4  Department of Endocrinology, Diabetes and Metabolism     57 y F w/ a PMHx of T2DM, HTN, HLD, and hyperthyroidism, presenting to the ED w/ weakness. Endocrinology consulted for uncontrolled T2DM.    #Uncontrolled Type 2 Diabetes Mellitus   - Hemoglobin A1c: 14.5%  - home regimen: Lantus 10 units qhs; pioglitazone 30mg qd; metformin 500mg BID  - no evidence of DKA on labs  - c/w lantus 18 units QHS   - c/w admelog 6 units before meals   - Continue low dose lispro correction scale TIDQAC and separate low dose scale QHS  - Please check FSG before meals and QHS, or q6h while NPO  - Inpatient glucose goals: <140 pre-meal, <180 random  - consistent carb diet when eating  - nutrition consult placed    Discharge planning:  - Discharge recs pending - will update today but likely will require basal/bolus insulin (dose TBD), increase home metformin to 1000mg BID, increase home pioglitazone to 45mg daily   - will need Endocrinology follow up.     #Hyperthyroidism  - liver enzymes wnl  - c/w PTU 50mg BID  - check TSH and free T4    #Hypertension  - Goal BP <130/80  - on HCTZ and metoprolol  - Management as per primary team    #Hyperlipidemia  - LDL goal <100  - would benefit from moderate dose statin    Higinio Landrum MD  Fellow, PGY-4  Department of Endocrinology, Diabetes and Metabolism     57 y F w/ a PMHx of T2DM, HTN, HLD, and hyperthyroidism, presenting to the ED w/ weakness. Endocrinology consulted for uncontrolled T2DM.    #Uncontrolled Type 2 Diabetes Mellitus   - Hemoglobin A1c: 14.5%  - home regimen: Lantus 10 units qhs; pioglitazone 30mg qd; metformin 500mg BID  - no evidence of DKA on labs  - increase lantus to 22 units QHS   - c/w admelog 6 units before meals   - Continue low dose lispro correction scale TIDQAC and separate low dose scale QHS  - Please check FSG before meals and QHS, or q6h while NPO  - Inpatient glucose goals: <140 pre-meal, <180 random  - consistent carb diet when eating  - nutrition consult placed  Discharge planning:  Discharge Rx:  - increase basalar to 22 units qhs  - admelog (or equivalent rapid-acting insulin) 6 units TID before meals  - please ensure patient has supplies including a glucometer, test strips, lancets, pen needles, and alcohol pads  - follow up w/ PCP for endocrinology referral    At home, Patient should check FSBG premeals and bedtime. Pt should call their doctor when FSBG <70 or above >400 and or consistently above 200s as changes in the regimen will have to be made    #Hyperthyroidism  - liver enzymes wnl  - c/w PTU 50mg qd for now  - check TSH, free T4, and total T3    #Hypertension  - Goal BP <130/80  - on HCTZ and metoprolol  - Management as per primary team    #Hyperlipidemia  - LDL goal <100  - would benefit from moderate dose statin    Higinio Landrum MD  Fellow, PGY-4  Department of Endocrinology, Diabetes and Metabolism     57 y F w/ a PMHx of T2DM, HTN, HLD, and hyperthyroidism, presenting to the ED w/ weakness. Endocrinology consulted for uncontrolled T2DM.    #Uncontrolled Type 2 Diabetes Mellitus   - Hemoglobin A1c: 14.5%  - home regimen: Lantus 10 units qhs; pioglitazone 30mg qd; metformin 500mg BID  - no evidence of DKA on labs  - increase lantus to 22 units QHS   - c/w admelog 6 units before meals   - Continue low dose lispro correction scale TIDQAC and separate low dose scale QHS  - Please check FSG before meals and QHS, or q6h while NPO  - Inpatient glucose goals: <140 pre-meal, <180 random  - consistent carb diet when eating  - nutrition consult placed  Discharge planning:  Discharge Rx:  - increase basalar to 22 units qhs  - admelog (or equivalent rapid-acting insulin) 6 units TID before meals  - please ensure patient has supplies including a glucometer, test strips, lancets, pen needles, and alcohol pads  - c/w pioglitazone 30mg qdaily  - increase metformin to 1000mg BID  - follow up w/ PCP for endocrinology referral    At home, Patient should check FSBG premeals and bedtime. Pt should call their doctor when FSBG <70 or above >400 and or consistently above 200s as changes in the regimen will have to be made    #Hyperthyroidism  - liver enzymes wnl  - c/w PTU 50mg qd for now  - check TSH, free T4, and total T3    #Hypertension  - Goal BP <130/80  - on HCTZ and metoprolol  - Management as per primary team    #Hyperlipidemia  - LDL goal <100  - would benefit from moderate dose statin    Higinio Landrum MD  Fellow, PGY-4  Department of Endocrinology, Diabetes and Metabolism     57 y F w/ a PMHx of T2DM, HTN, HLD, and hyperthyroidism, presenting to the ED w/ weakness. Endocrinology consulted for uncontrolled T2DM.    #Uncontrolled Type 2 Diabetes Mellitus   - Hemoglobin A1c: 14.5%  - home regimen: Lantus 10 units qhs; pioglitazone 30mg qd; metformin 500mg BID  - no evidence of DKA on labs  - increase lantus to 22 units QHS   - c/w admelog 6 units before meals   - Continue low dose lispro correction scale TIDQAC and separate low dose scale QHS  - Please check FSG before meals and QHS, or q6h while NPO  - Inpatient glucose goals: <140 pre-meal, <180 random  - consistent carb diet when eating  - nutrition consult placed  Discharge planning:  Discharge Rx:  - increase basaglar to 22 units qhs  - admelog (or equivalent rapid-acting insulin) 6 units TID before meals  - please ensure patient has supplies including a glucometer, test strips, lancets, pen needles, and alcohol pads  - c/w pioglitazone 30mg qdaily  - increase metformin to 1000mg BID  - follow up w/ PCP for endocrinology referral    At home, Patient should check FSBG premeals and bedtime. Pt should call their doctor when FSBG <70 or above >400 and or consistently above 200s as changes in the regimen will have to be made    #Hyperthyroidism  - liver enzymes wnl  - c/w PTU 50mg qd for now  - check TSH, free T4, and total T3  - follow up w/ PCP for endocrinology referral    #Hypertension  - Goal BP <130/80  - on HCTZ and metoprolol  - Management as per primary team    #Hyperlipidemia  - LDL goal <100  - would benefit from moderate dose statin    Higinio Landrum MD  Fellow, PGY-4  Department of Endocrinology, Diabetes and Metabolism     57 y F w/ a PMHx of T2DM, HTN, HLD, and hyperthyroidism, presenting to the ED w/ weakness. Endocrinology consulted for uncontrolled T2DM.    #Uncontrolled Type 2 Diabetes Mellitus   - Hemoglobin A1c: 14.5%  - home regimen: Lantus 10 units qhs; pioglitazone 30mg qd; metformin 500mg BID  - no evidence of DKA on labs  - increase lantus to 22 units QHS   - c/w admelog 6 units before meals   - Continue low dose lispro correction scale TIDQAC and separate low dose scale QHS  - Please check FSG before meals and QHS, or q6h while NPO  - Inpatient glucose goals: <140 pre-meal, <180 random  - consistent carb diet when eating  - nutrition consult placed  Discharge planning:  Discharge Rx:  - increase basaglar to 22 units qhs  - admelog (or equivalent rapid-acting insulin) 6 units TID before meals  - please ensure patient has supplies including a glucometer, test strips, lancets, pen needles, and alcohol pads  - c/w pioglitazone 30mg qdaily  - increase metformin to 1000mg BID  - follow up w/ PCP for endocrinology referral    At home, Patient should check FSBG premeals and bedtime. Pt should call their doctor when FSBG <70 or above >400 and or consistently above 200s as changes in the regimen will have to be made    #Hyperthyroidism  - liver enzymes normal  - TSH <0.1, low; total T3 130, normal; FT4 1.5, normal  - c/w PTU 50mg daily  - follow up w/ PCP for endocrinology referral    #Hypertension  - Goal BP <130/80  - on HCTZ and metoprolol  - Management as per primary team    #Hyperlipidemia  - LDL goal <100  - would benefit from moderate dose statin    Higinio Landrum MD  Fellow, PGY-4  Department of Endocrinology, Diabetes and Metabolism     57 y F w/ a PMHx of T2DM, HTN, HLD, and hyperthyroidism, presenting to the ED w/ weakness. Endocrinology consulted for uncontrolled T2DM.    #Uncontrolled Type 2 Diabetes Mellitus   - Hemoglobin A1c: 14.5%  - home regimen: Lantus 10 units qhs; pioglitazone 30mg qd; metformin 500mg BID  - no evidence of DKA on labs  - increase lantus to 22 units QHS   - c/w admelog 6 units before meals   - Continue low dose lispro correction scale TIDQAC and separate low dose scale QHS  - Please check FSG before meals and QHS, or q6h while NPO  - Inpatient glucose goals: <140 pre-meal, <180 random  - consistent carb diet when eating  - nutrition consult placed  Discharge planning:  Discharge Rx:  - increase basaglar to 22 units qhs  - New to rapid acting insulin pen: admelog (or equivalent rapid-acting insulin) 6 units TID before meals  - please ensure patient has supplies including a glucometer, test strips, lancets, pen needles, and alcohol pads  - c/w pioglitazone 30mg qdaily  - increase metformin to 1000mg BID  - follow up w/ PCP for endocrinology referral    At home, Patient should check FSBG premeals and bedtime. Pt should call their doctor when FSBG <70 or above >400 and or consistently above 200s as changes in the regimen will have to be made    #Hyperthyroidism  -Patient is unsure of the specific type of hyperthyroidism. Suspecting Graves' disease vs toxic nodule.  - liver enzymes normal  - TSH <0.1, low; total T3 130, normal; FT4 1.5, normal  - c/w PTU 50mg daily  - follow up w/ PCP for endocrinology referral  -Will need TSH receptor Ab and TSI Ab checked as outpatient.  -Patient would benefit from outpatient referral for radioactive iodine therapy.    #Hypertension  - Goal BP <130/80  - on HCTZ and metoprolol  - Management as per primary team    #Hyperlipidemia  - LDL goal <100  - would benefit from moderate dose statin    Higinio Landrum MD  Fellow, PGY-4  Department of Endocrinology, Diabetes and Metabolism

## 2023-07-24 NOTE — ED CDU PROVIDER DISPOSITION NOTE - PATIENT PORTAL LINK FT
You can access the FollowMyHealth Patient Portal offered by [facility] by registering at the following website: [email or web address]. By joining [facility]’s FollowMyHealth portal, you will also be able to view your health information using other applications (apps) compatible with our system.

## 2023-07-24 NOTE — ED ADULT NURSE REASSESSMENT NOTE - NS ED NURSE REASSESS COMMENT FT1
PT is resting in stretcher, easily arousable to verbal stimuli. no apparent distress noted at this time.  Report given to AMINAH Izquierdo in CDU.
Break RN: pt a&ox4, denying chest pain, sob, n+v, headache, dizziness, numbness, tingling. Breathing even, unlabored. Pending repeat lab work.

## 2023-07-24 NOTE — ED CDU PROVIDER SUBSEQUENT DAY NOTE - ATTENDING APP SHARED VISIT CONTRIBUTION OF CARE
57-year-old female past medical history diabetes, hypertension lipidemia presenting to ED for hyperglycemia generalized weakness.  Patient reports improvement of symptoms.  Overnight fingersticks improved.  Patient denies shortness of breath, chest pain, abdominal pain, nausea, vomiting, diarrhea, dysuria urinary frequency.  Exam as above  Plan continue insulin regimen, fingersticks per protocol, endocrinology eval.  Upon arrival patient had increased lactate which has improved with hydration and medical management.

## 2023-07-24 NOTE — ED CDU PROVIDER SUBSEQUENT DAY NOTE - PROGRESS NOTE DETAILS
Patient states that she is feeling much better, fingersticks improving.  Lactate initially elevated, received IV fluids, lactate now normal.  Denies abdominal pain, nausea, vomiting, diarrhea or urinary complaints at this time.  Patient pending Endo recommendations.  Vital signs stable, no acute distress, exam unremarkable.  We will continue to monitor and reassess.  Patient has no questions at this time.

## 2023-07-24 NOTE — ED CDU PROVIDER DISPOSITION NOTE - ATTENDING CONTRIBUTION TO CARE
57-year-old female past medical history diabetes, hypertension lipidemia presenting to ED for hyperglycemia generalized weakness.  Patient reports improvement of symptoms.  Overnight fingersticks improved.  Patient denies shortness of breath, chest pain, abdominal pain, nausea, vomiting, diarrhea, dysuria urinary frequency.  Exam as above  Plan continue insulin regimen, fingersticks per protocol, endocrinology eval.  Upon arrival patient had increased lactate which has improved with hydration and medical management.  Patient eval by endocrinology recs appreciated.  Stable for discharge with close outpatient follow-up.

## 2023-07-24 NOTE — ED CDU PROVIDER SUBSEQUENT DAY NOTE - CLINICAL SUMMARY MEDICAL DECISION MAKING FREE TEXT BOX
57-year-old female with past medical history of diabetes on insulin, hypertension, hyperlipidemia, presents to ED complaining of hyperglycemia and generalized weakness for 1 week.  sent to cdu for endo consult started on insulin overnight. pending morning labs.

## 2023-07-24 NOTE — ED CDU PROVIDER DISPOSITION NOTE - NSFOLLOWUPINSTRUCTIONS_ED_ALL_ED_FT
You were seen in the ER for elevated sugars and weakness.     Please see below for a copy of your results.     Increase Basaglar to 22 units qhs    Take Admelog (or equivalent rapid-acting insulin) 6 units TID before meals    Continue with pioglitazone 30mg daily.    Increase metformin to 1000mg two times a day.     Hyperglycemia    Hyperglycemia occurs when the glucose (sugar) level in your blood is too high. Symptoms include increased urination, increased thirst, a dry mouth, or changes in appetite. If started on a medication, take exactly as prescribed by your health care professional. Maintain a healthy lifestyle and follow up with your primary care physician.    SEEK IMMEDIATE MEDICAL CARE IF YOU HAVE ANY OF THE FOLLOWING SYMPTOMS: shortness of breath, change in mental status, nausea or vomiting, fruity smell to your breath, or any signs of dehydration.    Advance activity as tolerated.      Continue all previously prescribed medications as directed unless otherwise instructed.      Follow up with your primary care physician and endocrinology in 48-72 hours- bring copies of your results.

## 2023-07-24 NOTE — ED CDU PROVIDER DISPOSITION NOTE - CLINICAL COURSE
57-year-old female with past medical history of diabetes on insulin hypertension, hyperlipidemia presents emergency room for hyperglycemia and generalized weakness x1 week.  The ER sugars elevated into the 400s/500s without evidence of DKA.  Sent to CDU for IV hydration, endocrinology and trending lactates.  Lactate now normal.  Endo recommending increasing long-acting insulin, increasing metformin to 1000 mg twice daily and 6 units short acting premeals.  All supplies sent to the pharmacy, patient reporting improvement of symptoms and is ready to be discharged.  Vital signs stable, will DC with PMD and endocrinology follow-up.

## 2023-07-24 NOTE — CONSULT NOTE ADULT - ATTENDING COMMENTS
57F in CDU with severely uncontrolled DM2 HBA1c 14.5%, and also reports a history of hyperthyroidism on PTU.  Has been following with a pcp. Reviewed importance of endocrine follow up for both conditions.  [facility] endocrinology cannot see [facility] insurance outpatient, she should check her insurance network for endocrinologist.  DC on basal + bolus insulin, raise metformin and maintain pioglitazone.  TFTS, LFTs reviewed can continue PTU dose for now (reports allergy to other med which was likely methimazole).  Complex patient high level decision making.    Ludin Kitchen MD  Division of Endocrinology  Pager: [phone]    If after 6PM or before 9AM, or on weekends/holidays, please call endocrine answering service for assistance ([phone]).  For nonurgent matters email [email or web address] for assistance.

## 2023-07-24 NOTE — ED CDU PROVIDER SUBSEQUENT DAY NOTE - HISTORY
57-year-old female with past medical history of diabetes on insulin, hypertension, hyperlipidemia, presents to ED complaining of hyperglycemia and generalized weakness for 1 week.  sent to cdu for endo consult started on insulin overnight.

## 2023-07-25 LAB
T3 SERPL-MCNC: 103 NG/DL — SIGNIFICANT CHANGE UP (ref 80–200)
T4 AB SER-ACNC: 7.46 UG/DL — SIGNIFICANT CHANGE UP (ref 5.1–13)
T4 FREE SERPL-MCNC: 1.4 NG/DL — SIGNIFICANT CHANGE UP (ref 0.9–1.8)

## 2024-01-26 ENCOUNTER — EMERGENCY (EMERGENCY)
Facility: HOSPITAL | Age: 59
LOS: 1 days | Discharge: HOME | End: 2024-01-26
Admitting: EMERGENCY MEDICINE
Payer: MEDICAID

## 2024-01-26 VITALS
RESPIRATION RATE: 16 BRPM | SYSTOLIC BLOOD PRESSURE: 149 MMHG | TEMPERATURE: 98 F | DIASTOLIC BLOOD PRESSURE: 69 MMHG | HEART RATE: 69 BPM | OXYGEN SATURATION: 100 %

## 2024-01-26 DIAGNOSIS — Z98.51 TUBAL LIGATION STATUS: Chronic | ICD-10-CM

## 2024-01-26 PROCEDURE — 73590 X-RAY EXAM OF LOWER LEG: CPT | Mod: 26,LT

## 2024-01-26 PROCEDURE — 99285 EMERGENCY DEPT VISIT HI MDM: CPT

## 2024-01-26 PROCEDURE — 73610 X-RAY EXAM OF ANKLE: CPT | Mod: 26,LT

## 2024-01-26 PROCEDURE — 73630 X-RAY EXAM OF FOOT: CPT | Mod: 26,LT

## 2024-01-26 NOTE — ED PROVIDER NOTE - LOWER EXTREMITY EXAM, LEFT
+tenderness at lateral malleolar region; soft compartments; no joint laxity; 5/5 strength; sensation intact to light touch; < 2 sec cap refill; 2+ pulses; no crepitus; no redness; no warmth/SWELLING/TENDERNESS

## 2024-01-26 NOTE — ED PROVIDER NOTE - CLINICAL SUMMARY MEDICAL DECISION MAKING FREE TEXT BOX
Patient is a 58-year-old female with past medical history of diabetes mellitus type 2, hypertension, hyperlipidemia, hypothyroidism presents with left ankle pain since yesterday.  Patient reports while walking on wet lawn, she slipped, causing her to fall onto her bottom without associated head trauma or LOC.  Patient reports she twisted her left ankle with which she has had lateral malleoli are pain.  She states pain worsens with standing and walking.  Patient reports associated swelling at lateral malleoli region.  Patient denies any fevers, chills, headache, neck pain, back pain, chest pain, hip pain, abdominal pain, numbness, weakness, changes in vision or hearing, use of blood thinners.  This is a patient with musculoskeletal ankle pain, possible fracture, possible sprain.  Very low clinical suspicion for disease processes including not limited to septic joint, compartment syndrome, intracranial hemorrhage, spinal fracture.  Plan to order x-ray.  Disposition pending workup.

## 2024-01-26 NOTE — ED ADULT TRIAGE NOTE - CHIEF COMPLAINT QUOTE
pt c/o left ankle swelling and pain s/p slipping on wet grass landing on buttocks yesterday. Hx. HTN. DM2. pt denies LOC, hitting head or any other injuries. pt ambulatory in triage.

## 2024-01-26 NOTE — ED PROVIDER NOTE - OBJECTIVE STATEMENT
Patient is a 58-year-old female with past medical history of diabetes mellitus type 2, hypertension, hyperlipidemia, hypothyroidism presents with left ankle pain since yesterday.  Patient reports while walking on wet lawn, she slipped, causing her to fall onto her bottom without associated head trauma or LOC.  Patient reports she twisted her left ankle with which she has had lateral malleoli are pain.  She states pain worsens with standing and walking.  Patient reports associated swelling at lateral malleoli region.  Patient denies any fevers, chills, headache, neck pain, back pain, chest pain, hip pain, abdominal pain, numbness, weakness, changes in vision or hearing, use of blood thinners.

## 2024-01-26 NOTE — CONSULT NOTE ADULT - SUBJECTIVE AND OBJECTIVE BOX
Patient is a 58y old  Female who presents with a chief complaint of     HPI:      PAST MEDICAL & SURGICAL HISTORY:  HTN (hypertension)      HLD (hyperlipidemia)      Diabetes      Hyperthyroidism      S/P tubal ligation          MEDICATIONS  (STANDING):    MEDICATIONS  (PRN):      Allergies    lisinopril (Rash)    Intolerances        VITALS:    Vital Signs Last 24 Hrs  T(C): 36.7 (26 Jan 2024 21:01), Max: 36.7 (26 Jan 2024 21:01)  T(F): 98.1 (26 Jan 2024 21:01), Max: 98.1 (26 Jan 2024 21:01)  HR: 69 (26 Jan 2024 21:01) (69 - 69)  BP: 149/69 (26 Jan 2024 21:01) (149/69 - 149/69)  BP(mean): --  RR: 16 (26 Jan 2024 21:01) (16 - 16)  SpO2: 100% (26 Jan 2024 21:01) (100% - 100%)    Parameters below as of 26 Jan 2024 21:01  Patient On (Oxygen Delivery Method): room air        LABS:                CAPILLARY BLOOD GLUCOSE      POCT Blood Glucose.: 132 mg/dL (26 Jan 2024 21:10)          LOWER EXTREMITY PHYSICAL EXAM:    Vascular: DP/PT _/4, B/L, CFT <_ seconds B/L, Temperature gradient _, B/L.   Neuro: Epicritic sensation _ to the level of _, B/L.  Musculoskeletal/Ortho:  Skin:  Wound #1:   Location:  Size:  Depth:  Wound bed:   Drainage:   Odor:   Periwound:  Etiology:     RADIOLOGY & ADDITIONAL STUDIES:     · HPI Objective Statement: Patient is a 58-year-old female with past medical history of diabetes mellitus type 2, hypertension, hyperlipidemia, hypothyroidism presents with left ankle pain since yesterday.  Patient reports while walking on wet lawn, she slipped, causing her to fall onto her bottom without associated head trauma or LOC.  Patient reports she twisted her left ankle with which she has had lateral malleoli are pain.  She states pain worsens with standing and walking.  Patient reports associated swelling at lateral malleoli region.  Patient denies any fevers, chills, headache, neck pain, back pain, chest pain, hip pain, abdominal pain, numbness, weakness, changes in vision or hearing, use of blood thinners.  · Chief Complaint: The patient is a 58y Female complaining of ankle pain/injury.        PAST MEDICAL & SURGICAL HISTORY:  HTN (hypertension)      HLD (hyperlipidemia)      Diabetes      Hyperthyroidism      S/P tubal ligation          MEDICATIONS  (STANDING):    MEDICATIONS  (PRN):      Allergies    lisinopril (Rash)    Intolerances        VITALS:    Vital Signs Last 24 Hrs  T(C): 36.7 (26 Jan 2024 21:01), Max: 36.7 (26 Jan 2024 21:01)  T(F): 98.1 (26 Jan 2024 21:01), Max: 98.1 (26 Jan 2024 21:01)  HR: 69 (26 Jan 2024 21:01) (69 - 69)  BP: 149/69 (26 Jan 2024 21:01) (149/69 - 149/69)  BP(mean): --  RR: 16 (26 Jan 2024 21:01) (16 - 16)  SpO2: 100% (26 Jan 2024 21:01) (100% - 100%)    Parameters below as of 26 Jan 2024 21:01  Patient On (Oxygen Delivery Method): room air        LABS:                CAPILLARY BLOOD GLUCOSE      POCT Blood Glucose.: 132 mg/dL (26 Jan 2024 21:10)          LOWER EXTREMITY PHYSICAL EXAM:    Vascular: DP/PT 2/4, B/L, CFT <3 seconds B/L, Temperature gradient warm to cool , B/L.   Neuro: Epicritic sensation intact to the level of digits, B/L.  Musculoskeletal/Ortho: Left ankle Pain with ankle t range of motion, Muscle strength 5/5,Localized tenderness to the distal lateral malleolus and the ATFL, No tenderness to the distal medial tibial or the deltoid ligament, Negative syndesmotic squeeze test, Negative manual stress test.  Skin: L ankle +1 pitting edema, no skin tenting or  fracture blisters or ecchymosis, no open wounds, no signs of infection, R foot no open wounds, no signs of infection.       RADIOLOGY & ADDITIONAL STUDIES:    < from: Xray Foot AP + Lateral + Oblique, Left (01.26.24 @ 22:23) >    ACC: [id number] EXAM:  XR FOOT COMP MIN 3 VIEWS LT   ORDERED BY: SUN MAY     ACC: [id number] EXAM:  XR TIB FIB AP LAT 2 VIEWS LT   ORDERED BY: SUN MAY     PROCEDURE DATE:  01/26/2024    ******PRELIMINARY REPORT******      ******PRELIMINARY REPORT******           INTERPRETATION:  CLINICAL INFORMATION: Left lower cavity pain.    TECHNIQUE:2 views of the left tibia/fibula, 3 views of the left ankle, 3   views of the foot.    COMPARISON: No similar prior studies available for comparison.    FINDINGS:    Acute mildly displaced left distal fibular fracture.  The ankle mortise is maintained. Mild left knee joint osteoarthritis with   marginal osteophytes. The foot joints are unremarkable.  Small ankle joint effusion.  Soft tissue swelling predominantly surrounding the lateral ankle.    IMPRESSION:    Acute mildly displaced left distal fibular fracture.        ******PRELIMINARY REPORT******      ******PRELIMINARY REPORT******       COREY PHILIP DO; Resident Radiologist  This document is a PRELIMINARY interpretation and is pending final   attending approval. Jan 26 2024 10:37PM    < end of copied text >

## 2024-01-26 NOTE — ED PROVIDER NOTE - NSFOLLOWUPINSTRUCTIONS_ED_ALL_ED_FT
Advance activity as tolerated.  Continue all previously prescribed medications as directed unless otherwise instructed.  Take Tylenol 650mg (Two 325 mg pills) every 4-6 hours as needed for pain or fevers. Keep splint clean and dry.  Ambulate with crutches as shown to you in the emergency department.  Follow up with your primary care physician in 48-72 hours- bring copies of your results.  Return to the ER for worsening or persistent symptoms, including but not limited to worsening/persistent pain, fever, redness, swelling, numbness, weakness, difficulty standing/walking, falls, and/or ANY NEW OR CONCERNING SYMPTOMS. If you have issues obtaining follow up, please call: [phone] to obtain a doctor or specialist who takes your insurance in your area.  You may call [phone] to make an appointment with the internal medicine clinic.     SPLINT CARE - General Information    Splint Care    WHAT YOU NEED TO KNOW:    What do I need to know about splint care? Splint care is important to help protect your splint until it comes off. Some splints are made of fiberglass or plaster that will need to dry and harden. Splint care will help the splint dry and harden correctly. Even after your splint hardens, it can be damaged.    How do I care for my splint?    Wait for your hard splint to harden completely. You may have to wait up to 3 days before you can walk on a plaster splint.    Check your splint and the skin around it each day. Check your splint for damage, such as cracks and breaks. Check your skin for redness, increased swelling, and sores. Loosen the elastic bandage around your splint if it feels too tight.    Keep your splint clean and dry. Keep dirt out of your splint. Before you bathe, wrap your hard splint with 2 layers of plastic. Then put a plastic bag over it. Keep the plastic bag tightly sealed. You can also ask your healthcare provider about waterproof shields. Do not put your hard splint in the water, even with a plastic bag over it. A wet splint can make your skin itchy, and may lead to infection.    Do not put powders or deodorants inside your splint. These can dry your skin and increase itching.    Do not try to scratch the skin inside your hard splint with sharp objects. Sharp objects can break off inside your splint or hurt your skin.    Do not pull the padding out of your splint. The padding inside your splint protects your skin. You may develop a sore on your skin if you take out the padding.  When should I seek immediate care?    You have increased pain.    Your fingers or toes are numb or tingling.    You feel burning or stinging around your injury.    Your nails, fingers, or toes turn pale, blue, or gray, and feel cold.    You have new or increased trouble moving your fingers or toes.    Your swelling gets worse.    The skin under your splint is bleeding or leaking pus.  When should I contact my healthcare provider?    Your hard splint gets wet or is damaged.    You have a fever.    Your splint feels tighter.    You have itchy, dry skin under your splint that is getting worse.    The skin under your splint is red, or you have a new sore.    You notice a bad smell coming from your splint.    You have questions or concerns about your condition or care.  CARE AGREEMENT:    You have the right to help plan your care. Learn about your health condition and how it may be treated. Discuss treatment options with your healthcare providers to decide what care you want to receive. You always have the right to refuse treatment.    © [employer] 1973, 2023    CRUTCH INSTRUCTIONS - General Information    Crutch Instructions    WHAT YOU NEED TO KNOW:    Why do I need crutches? Crutches are tools that provide support and balance when you walk. You may need 1 or 2 crutches to help support your body weight. You may need crutches if you had surgery or an injury that affects your ability to walk.    How do I use crutches safely?    Support your weight with your arms and hands. Do not support your weight with your armpits. This could hurt the nerves and blood vessels that are in your armpits. Keep your elbow bent when the crutch is in place under your arm.    Walk slowly and carefully with crutches. Go up and down stairs and ramps slowly. Stop to rest when you feel tired. Get up slowly to a sitting or standing position. This will help prevent dizziness and fainting. Use your crutches only on firm ground. Use caution when you walk on ice or snow. Wet or waxed floors and smooth cement floors can be slippery. Watch out for small rugs or cords.    Create clear pathways between rooms in your home. You may need to move your furniture to do this.    Keep your needed items within reach. Carry items in a bag or backpack to keep your hands free.  How do I walk with crutches?    Place both crutches under your arms. Place your hands on the hand  of the crutches. Place your crutches slightly in front of you.    Position the crutches. The top of the crutches should be about 2 fingers [id number] (about 1½ inches) below your armpits. Place your weight on your hands. The top of the crutches should not press into your armpits.    If you have one leg that is injured, keep it off the floor by bending your knee.    Lift the crutches and move them a step ahead of you. Put the rubber ends of the crutches firmly on the ground. Move your injured leg between the crutches and slowly bring your body forward. Shift your weight onto the crutches using your arms. Take a normal step with your uninjured leg.    If you are using your crutches for balance, move your right foot and left crutch forward. Then move your left foot and right crutch forward. Keep walking this way.  Walking with Crutches  How do I go up stairs with crutches?    Face the stairs. Put the crutches close to the first step.    Push onto the crutches and put your uninjured leg on the first step.    Put your weight on your uninjured leg that is on the first step. Bring both crutches and the injured leg onto the step at the same time. Make sure the rubber ends of the crutches are completely on the step.    When you hold onto a railing with one arm, put both crutches under the other arm. Use the railing to help you go up the stairs.  Crutches Going Upstairs With Crutches  How do I go down stairs with crutches?    Stand with the toes of your uninjured leg close to the edge of the step.    Bend the knee of your uninjured leg. Slowly lower both crutches along with the injured leg onto the next step.    Lean on your crutches. Slowly lower your uninjured leg onto the same step.    Place both crutches under one arm while you hold onto the railing with the other arm.  Crutches Going Downstairs with Crutches  How do I sit in a chair with crutches?    Make sure the chair is sturdy and will not move. Turn and back up to the chair until you feel the edge of it against the backs of your legs. Keep your injured leg forward.    Hold both crutches with one hand. Use your other hand to reach back and hold on to the chair. Slowly lower your body to the chair.  Crutches Sitting Down with Crutches  How do I get up from a chair with crutches?    Sit on the edge of your chair.    Push up with your hands using the crutches or arms of the chair. Put your weight on your uninjured foot as you get up.    Keep your injured leg bent at the knee and off the floor.  Crutches Standing Up with Crutches  When should I seek immediate care?    You have sudden numbness in a hand or arm.    Your arm is swollen, red, and warm to the touch.    Your fingers feel cold or have cramping pain.  When should I call my doctor?    Your crutches do not fit.    One crutch is longer than the other.    Your crutches break or get lost.    The rubber tips of your crutches are split or loose.    You get blisters or painful calluses on your hands or armpits.    Your armpit is red, sore, or has bumps or pimples.    Your arm muscles get weaker the longer you use the crutches.    You have questions or concerns about your condition or care.  CARE AGREEMENT:    You have the right to help plan your care. Learn about your health condition and how it may be treated. Discuss treatment options with your healthcare providers to decide what care you want to receive. You always have the right to refuse treatment.    © [employer] 1973, 2023 NO weight bearing on your left lower extremity.    Keep splint clean and dry.  Use crutches as advised.  Ambulate with crutches as shown to you in the emergency department.  NO DRIVING!!      For pain, you may take acetaminophen (Tylenol) as needed; follow package instructions carefully regarding dosage / usage.  Be careful not to exceed the total daily maximum dosage.    Follow up with your primary care physician in 48-72 hours- bring copies of your discharge papers / test results.      Follow up with Podiatry this week.  The podiatrist that saw you in the ER provided the following podiatrist's information for follow up:            [place] [address]            Phone: [phone]            Fax: [phone]  Call the office to schedule your appointment; be sure to let office staff know that this is a follow up to your Emergency Department visit and that you were advised to follow up this week.  **Bring your discharge papers / test results with you to your follow up appointment.    If you need assistance finding an alternate provider to follow up with, you may call the [facility] Patient Access Services helpline at [phone] to find names/contact #s for a provider/specialist to follow up with.      ***Return to the Emergency Department IMMEDIATELY if you experience any new / worsening symptoms or have any problems / concerns, including but not limited to worsening/persistent pain, fever, redness, swelling, numbness, weakness, difficulty standing/walking, falls, and/or ANY NEW OR CONCERNING SYMPTOMS.***        SPLINT CARE - General Information    Splint Care    WHAT YOU NEED TO KNOW:    What do I need to know about splint care? Splint care is important to help protect your splint until it comes off. Some splints are made of fiberglass or plaster that will need to dry and harden. Splint care will help the splint dry and harden correctly. Even after your splint hardens, it can be damaged.    How do I care for my splint?    Wait for your hard splint to harden completely. You may have to wait up to 3 days before you can walk on a plaster splint.    Check your splint and the skin around it each day. Check your splint for damage, such as cracks and breaks. Check your skin for redness, increased swelling, and sores. Loosen the elastic bandage around your splint if it feels too tight.    Keep your splint clean and dry. Keep dirt out of your splint. Before you bathe, wrap your hard splint with 2 layers of plastic. Then put a plastic bag over it. Keep the plastic bag tightly sealed. You can also ask your healthcare provider about waterproof shields. Do not put your hard splint in the water, even with a plastic bag over it. A wet splint can make your skin itchy, and may lead to infection.    Do not put powders or deodorants inside your splint. These can dry your skin and increase itching.    Do not try to scratch the skin inside your hard splint with sharp objects. Sharp objects can break off inside your splint or hurt your skin.    Do not pull the padding out of your splint. The padding inside your splint protects your skin. You may develop a sore on your skin if you take out the padding.  When should I seek immediate care?    You have increased pain.    Your fingers or toes are numb or tingling.    You feel burning or stinging around your injury.    Your nails, fingers, or toes turn pale, blue, or gray, and feel cold.    You have new or increased trouble moving your fingers or toes.    Your swelling gets worse.    The skin under your splint is bleeding or leaking pus.  When should I contact my healthcare provider?    Your hard splint gets wet or is damaged.    You have a fever.    Your splint feels tighter.    You have itchy, dry skin under your splint that is getting worse.    The skin under your splint is red, or you have a new sore.    You notice a bad smell coming from your splint.    You have questions or concerns about your condition or care.  CARE AGREEMENT:    You have the right to help plan your care. Learn about your health condition and how it may be treated. Discuss treatment options with your healthcare providers to decide what care you want to receive. You always have the right to refuse treatment.    © [employer] 1973, 2023    CRUTCH INSTRUCTIONS - General Information    Crutch Instructions    WHAT YOU NEED TO KNOW:    Why do I need crutches? Crutches are tools that provide support and balance when you walk. You may need 1 or 2 crutches to help support your body weight. You may need crutches if you had surgery or an injury that affects your ability to walk.    How do I use crutches safely?    Support your weight with your arms and hands. Do not support your weight with your armpits. This could hurt the nerves and blood vessels that are in your armpits. Keep your elbow bent when the crutch is in place under your arm.    Walk slowly and carefully with crutches. Go up and down stairs and ramps slowly. Stop to rest when you feel tired. Get up slowly to a sitting or standing position. This will help prevent dizziness and fainting. Use your crutches only on firm ground. Use caution when you walk on ice or snow. Wet or waxed floors and smooth cement floors can be slippery. Watch out for small rugs or cords.    Create clear pathways between rooms in your home. You may need to move your furniture to do this.    Keep your needed items within reach. Carry items in a bag or backpack to keep your hands free.  How do I walk with crutches?    Place both crutches under your arms. Place your hands on the hand  of the crutches. Place your crutches slightly in front of you.    Position the crutches. The top of the crutches should be about 2 fingers [id number] (about 1½ inches) below your armpits. Place your weight on your hands. The top of the crutches should not press into your armpits.    If you have one leg that is injured, keep it off the floor by bending your knee.    Lift the crutches and move them a step ahead of you. Put the rubber ends of the crutches firmly on the ground. Move your injured leg between the crutches and slowly bring your body forward. Shift your weight onto the crutches using your arms. Take a normal step with your uninjured leg.    If you are using your crutches for balance, move your right foot and left crutch forward. Then move your left foot and right crutch forward. Keep walking this way.  Walking with Crutches  How do I go up stairs with crutches?    Face the stairs. Put the crutches close to the first step.    Push onto the crutches and put your uninjured leg on the first step.    Put your weight on your uninjured leg that is on the first step. Bring both crutches and the injured leg onto the step at the same time. Make sure the rubber ends of the crutches are completely on the step.    When you hold onto a railing with one arm, put both crutches under the other arm. Use the railing to help you go up the stairs.  Crutches Going Upstairs With Crutches  How do I go down stairs with crutches?    Stand with the toes of your uninjured leg close to the edge of the step.    Bend the knee of your uninjured leg. Slowly lower both crutches along with the injured leg onto the next step.    Lean on your crutches. Slowly lower your uninjured leg onto the same step.    Place both crutches under one arm while you hold onto the railing with the other arm.  Crutches Going Downstairs with Crutches  How do I sit in a chair with crutches?    Make sure the chair is sturdy and will not move. Turn and back up to the chair until you feel the edge of it against the backs of your legs. Keep your injured leg forward.    Hold both crutches with one hand. Use your other hand to reach back and hold on to the chair. Slowly lower your body to the chair.  Crutches Sitting Down with Crutches  How do I get up from a chair with crutches?    Sit on the edge of your chair.    Push up with your hands using the crutches or arms of the chair. Put your weight on your uninjured foot as you get up.    Keep your injured leg bent at the knee and off the floor.  Crutches Standing Up with Crutches  When should I seek immediate care?    You have sudden numbness in a hand or arm.    Your arm is swollen, red, and warm to the touch.    Your fingers feel cold or have cramping pain.  When should I call my doctor?    Your crutches do not fit.    One crutch is longer than the other.    Your crutches break or get lost.    The rubber tips of your crutches are split or loose.    You get blisters or painful calluses on your hands or armpits.    Your armpit is red, sore, or has bumps or pimples.    Your arm muscles get weaker the longer you use the crutches.    You have questions or concerns about your condition or care.  CARE AGREEMENT:    You have the right to help plan your care. Learn about your health condition and how it may be treated. Discuss treatment options with your healthcare providers to decide what care you want to receive. You always have the right to refuse treatment.    © [employer] 1973, 2023

## 2024-01-26 NOTE — ED PROVIDER NOTE - PROGRESS NOTE DETAILS
HAN Cox Addendum-----This patient was signed out to me by HAN Smith; pt had been evaluated by Podiatry, pt was given crutches w/ instruction; at sign-out, pt was pending CT imaging as advised by Podiatry, with discussion at sign-out that pt does not need to wait for results; CT imaging is for outpatient consideration / further management ie potential outpatient operative planning.  In the interim, pt objectively noted to be resting comfortably; pt has been clinically stable; no issues thus far.  Pt had CT imaging performed.  I spoke with the covering podiatry resident to confirm that pt does not need to wait for radiology results; this was confirmed.  Pt will be dc'd home per below instructions.

## 2024-01-26 NOTE — ED PROVIDER NOTE - PATIENT PORTAL LINK FT
You can access the FollowMyHealth Patient Portal offered by [facility] by registering at the following website: [email or web address]. By joining [employer]’s FollowMyHealth portal, you will also be able to view your health information using other applications (apps) compatible with our system.

## 2024-01-26 NOTE — CONSULT NOTE ADULT - ASSESSMENT
59 yo F with L ankle acute minimal displaced distal fracture  - Pt seen and evaluated.  - Afebrile, neurovascular status intact   - Left ankle Pain with ankle t range of motion, Muscle strength 5/5,Localized tenderness to the distal lateral malleolus and the ATFL, No tenderness to the distal medial tibial or the deltoid ligament, Negative syndesmotic squeeze test, Negative manual stress test.  Skin: L ankle +1 pitting edema, no skin tenting or  fracture blisters or ecchymosis, no open wounds, no signs of infection, R foot no open wounds, no signs of infection.   - LLE X-ray: Acute mildly displaced left distal fibular fracture.  - Applied reid compression followed by posterior splint  - Ordered CT with 3d reconstruction  - Recommended non-weight bearing to the left lower extremity  - Patient to keep dressing clean, dry and intact   - Please call [phone] within a week to make an appointment with Dr. Blake  - Discussed with Attending

## 2024-01-27 PROBLEM — E11.9 TYPE 2 DIABETES MELLITUS WITHOUT COMPLICATIONS: Chronic | Status: ACTIVE | Noted: 2023-07-23

## 2024-01-27 PROBLEM — E78.5 HYPERLIPIDEMIA, UNSPECIFIED: Chronic | Status: ACTIVE | Noted: 2023-07-23

## 2024-01-27 PROBLEM — I10 ESSENTIAL (PRIMARY) HYPERTENSION: Chronic | Status: ACTIVE | Noted: 2023-07-23

## 2024-01-27 PROBLEM — E05.90 THYROTOXICOSIS, UNSPECIFIED WITHOUT THYROTOXIC CRISIS OR STORM: Chronic | Status: ACTIVE | Noted: 2023-07-23

## 2024-01-27 PROCEDURE — G1004: CPT

## 2024-01-27 PROCEDURE — 76376 3D RENDER W/INTRP POSTPROCES: CPT | Mod: 26

## 2024-01-27 PROCEDURE — 73700 CT LOWER EXTREMITY W/O DYE: CPT | Mod: 26,LT,ME

## 2024-01-27 NOTE — ED ADULT NURSE NOTE - MODE OF DISCHARGE
crutches taught to patient by HAN Smith earlier/Ambulatory with cane/crutches/walker wheelchair by PCA/Wheelchair

## 2024-01-27 NOTE — ED ADULT NURSE REASSESSMENT NOTE - NS ED NURSE REASSESS COMMENT FT1
Pt well appearing HOB elevated. Arrives to RW from intake. Respirations even and unlabored. No acute distress noted. Speaking in full and complete sentences. family at bedside. Safety maintained. Awaiting dispo as per HAN Cox.

## 2024-02-02 ENCOUNTER — EMERGENCY (EMERGENCY)
Facility: HOSPITAL | Age: 59
LOS: 1 days | Discharge: HOME | End: 2024-02-02
Admitting: EMERGENCY MEDICINE
Payer: MEDICAID

## 2024-02-02 VITALS
DIASTOLIC BLOOD PRESSURE: 72 MMHG | OXYGEN SATURATION: 99 % | RESPIRATION RATE: 16 BRPM | HEART RATE: 73 BPM | TEMPERATURE: 98 F | SYSTOLIC BLOOD PRESSURE: 161 MMHG

## 2024-02-02 DIAGNOSIS — Z98.51 TUBAL LIGATION STATUS: Chronic | ICD-10-CM

## 2024-02-02 PROCEDURE — 99283 EMERGENCY DEPT VISIT LOW MDM: CPT

## 2024-02-02 NOTE — ED PROVIDER NOTE - PATIENT PORTAL LINK FT
You can access the FollowMyHealth Patient Portal offered by [facility] by registering at the following website: [email or web address]. By joining [facility] FollowMyHealth portal, you will also be able to view your health information using other applications (apps) compatible with our system.

## 2024-02-02 NOTE — ED PROVIDER NOTE - OBJECTIVE STATEMENT
Patient is a 58-year-old female with past medical history of diabetes mellitus type 2, hypertension, hyperlipidemia, hypothyroidism, recent left fibular fracture presents looking for assistance getting orthopedics appointment.  Patient was seen in ED recently for left fibular fracture, for which patient posterior splint placed by podiatry.  Patient states upon calling podiatrist for outpatient follow, she was told that she would have to follow up with orthopedics.  Patient states she has not yet called an orthopedic physician to make an appointment.  Patient reports pain since initial ED visit significantly improved.  She denies any fevers, chills, numbness, weakness, chest pain, shortness of breath, recurrent falls.

## 2024-02-02 NOTE — ED ADULT NURSE NOTE - DISCHARGE DATE/TIME
Ok to fill  Patient called today with request for refill on Gabapentin. Last office visit 10/30/17 with next scheduled appointment 05/02/18  Dose verified. Last UDS Scheduled for next visit.
02-Feb-2024 12:48

## 2024-02-02 NOTE — ED ADULT NURSE NOTE - OBJECTIVE STATEMENT
Patient received in wellness, exam room 4. Patient A&Ox3 and ambulatory at baseline. Patient presents to the ED c/o sustaining closed fracture to left distal fibula. Patient currently has injury casted and wrapped in ace bandage. Patient states she has been having difficulty finding podiatrist/orthopedic appointment. Patient denies significant phx. Patient denies headache, dizziness, lightheadedness, nausea/vomiting, fever/chills, and pain. Patient offers no complaints at this time. Respirations even and unlabored, no signs/symptoms of acute distress; patient denies dyspnea, shortness of breath, and chest pain. Patient is stable at this time.

## 2024-02-02 NOTE — ED PROVIDER NOTE - NSFOLLOWUPINSTRUCTIONS_ED_ALL_ED_FT
Advance activity as tolerated.  Continue all previously prescribed medications as directed unless otherwise instructed.  Follow up with your primary care physician and orthopedics (referral list provided or call [phone] to make an appointment with the orthopedics clinic)  in 48-72 hours- bring copies of your results.  Return to the ER for worsening or persistent symptoms, including but not limited to worsening/persistent pain, fever, redness, swelling, numbness, weakness, difficulty standing/walking, falls, calf pain, chest pain, shortness of breath, and/or ANY NEW OR CONCERNING SYMPTOMS. If you have issues obtaining follow up, please call: [phone] to obtain a doctor or specialist who takes your insurance in your area.  You may call [phone] to make an appointment with the internal medicine clinic.     SPLINT CARE - General Information    Splint Care    WHAT YOU NEED TO KNOW:    What do I need to know about splint care? Splint care is important to help protect your splint until it comes off. Some splints are made of fiberglass or plaster that will need to dry and harden. Splint care will help the splint dry and harden correctly. Even after your splint hardens, it can be damaged.    How do I care for my splint?    Wait for your hard splint to harden completely. You may have to wait up to 3 days before you can walk on a plaster splint.    Check your splint and the skin around it each day. Check your splint for damage, such as cracks and breaks. Check your skin for redness, increased swelling, and sores. Loosen the elastic bandage around your splint if it feels too tight.    Keep your splint clean and dry. Keep dirt out of your splint. Before you bathe, wrap your hard splint with 2 layers of plastic. Then put a plastic bag over it. Keep the plastic bag tightly sealed. You can also ask your healthcare provider about waterproof shields. Do not put your hard splint in the water, even with a plastic bag over it. A wet splint can make your skin itchy, and may lead to infection.    Do not put powders or deodorants inside your splint. These can dry your skin and increase itching.    Do not try to scratch the skin inside your hard splint with sharp objects. Sharp objects can break off inside your splint or hurt your skin.    Do not pull the padding out of your splint. The padding inside your splint protects your skin. You may develop a sore on your skin if you take out the padding.  When should I seek immediate care?    You have increased pain.    Your fingers or toes are numb or tingling.    You feel burning or stinging around your injury.    Your nails, fingers, or toes turn pale, blue, or gray, and feel cold.    You have new or increased trouble moving your fingers or toes.    Your swelling gets worse.    The skin under your splint is bleeding or leaking pus.  When should I contact my healthcare provider?    Your hard splint gets wet or is damaged.    You have a fever.    Your splint feels tighter.    You have itchy, dry skin under your splint that is getting worse.    The skin under your splint is red, or you have a new sore.    You notice a bad smell coming from your splint.    You have questions or concerns about your condition or care.  CARE AGREEMENT:    You have the right to help plan your care. Learn about your health condition and how it may be treated. Discuss treatment options with your healthcare providers to decide what care you want to receive. You always have the right to refuse treatment.    © [employer] 1973, 2023     CRUTCH INSTRUCTIONS - General Information    Crutch Instructions    WHAT YOU NEED TO KNOW:    Why do I need crutches? Crutches are tools that provide support and balance when you walk. You may need 1 or 2 crutches to help support your body weight. You may need crutches if you had surgery or an injury that affects your ability to walk.    How do I use crutches safely?    Support your weight with your arms and hands. Do not support your weight with your armpits. This could hurt the nerves and blood vessels that are in your armpits. Keep your elbow bent when the crutch is in place under your arm.    Walk slowly and carefully with crutches. Go up and down stairs and ramps slowly. Stop to rest when you feel tired. Get up slowly to a sitting or standing position. This will help prevent dizziness and fainting. Use your crutches only on firm ground. Use caution when you walk on ice or snow. Wet or waxed floors and smooth cement floors can be slippery. Watch out for small rugs or cords.    Create clear pathways between rooms in your home. You may need to move your furniture to do this.    Keep your needed items within reach. Carry items in a bag or backpack to keep your hands free.  How do I walk with crutches?    Place both crutches under your arms. Place your hands on the hand  of the crutches. Place your crutches slightly in front of you.    Position the crutches. The top of the crutches should be about 2 fingers [id number] (about 1½ inches) below your armpits. Place your weight on your hands. The top of the crutches should not press into your armpits.    If you have one leg that is injured, keep it off the floor by bending your knee.    Lift the crutches and move them a step ahead of you. Put the rubber ends of the crutches firmly on the ground. Move your injured leg between the crutches and slowly bring your body forward. Shift your weight onto the crutches using your arms. Take a normal step with your uninjured leg.    If you are using your crutches for balance, move your right foot and left crutch forward. Then move your left foot and right crutch forward. Keep walking this way.  Walking with Crutches  How do I go up stairs with crutches?    Face the stairs. Put the crutches close to the first step.    Push onto the crutches and put your uninjured leg on the first step.    Put your weight on your uninjured leg that is on the first step. Bring both crutches and the injured leg onto the step at the same time. Make sure the rubber ends of the crutches are completely on the step.    When you hold onto a railing with one arm, put both crutches under the other arm. Use the railing to help you go up the stairs.  Crutches Going Upstairs With Crutches  How do I go down stairs with crutches?    Stand with the toes of your uninjured leg close to the edge of the step.    Bend the knee of your uninjured leg. Slowly lower both crutches along with the injured leg onto the next step.    Lean on your crutches. Slowly lower your uninjured leg onto the same step.    Place both crutches under one arm while you hold onto the railing with the other arm.  Crutches Going Downstairs with Crutches  How do I sit in a chair with crutches?    Make sure the chair is sturdy and will not move. Turn and back up to the chair until you feel the edge of it against the backs of your legs. Keep your injured leg forward.    Hold both crutches with one hand. Use your other hand to reach back and hold on to the chair. Slowly lower your body to the chair.  Crutches Sitting Down with Crutches  How do I get up from a chair with crutches?    Sit on the edge of your chair.    Push up with your hands using the crutches or arms of the chair. Put your weight on your uninjured foot as you get up.    Keep your injured leg bent at the knee and off the floor.  Crutches Standing Up with Crutches  When should I seek immediate care?    You have sudden numbness in a hand or arm.    Your arm is swollen, red, and warm to the touch.    Your fingers feel cold or have cramping pain.  When should I call my doctor?    Your crutches do not fit.    One crutch is longer than the other.    Your crutches break or get lost.    The rubber tips of your crutches are split or loose.    You get blisters or painful calluses on your hands or armpits.    Your armpit is red, sore, or has bumps or pimples.    Your arm muscles get weaker the longer you use the crutches.    You have questions or concerns about your condition or care.  CARE AGREEMENT:    You have the right to help plan your care. Learn about your health condition and how it may be treated. Discuss treatment options with your healthcare providers to decide what care you want to receive. You always have the right to refuse treatment.    © [employer] 1973, 2023

## 2024-02-02 NOTE — ED PROVIDER NOTE - CLINICAL SUMMARY MEDICAL DECISION MAKING FREE TEXT BOX
Patient is a 58-year-old female with past medical history of diabetes mellitus type 2, hypertension, hyperlipidemia, hypothyroidism, recent left fibular fracture presents looking for assistance getting orthopedics appointment.  Patient was seen in ED recently for left fibular fracture, for which patient posterior splint placed by podiatry.  Patient states upon calling podiatrist for outpatient follow, she was told that she would have to follow up with orthopedics.  Patient states she has not yet called an orthopedic physician to make an appointment.  Patient reports pain since initial ED visit significantly improved.  She denies any fevers, chills, numbness, weakness, chest pain, shortness of breath, recurrent falls.  This is a patient with a fibular fracture.  Not clinically concerning for compartment syndrome.  Plan to involve discharge lounge to assist with outpatient orthopedics follow up.

## 2024-02-02 NOTE — ED PROVIDER NOTE - PROGRESS NOTE DETAILS
HAN MAY:  Pt medically stable for discharge.  Strict return precautions given.  Pt to follow up with PMD and ortho (referral list provided).

## 2024-02-02 NOTE — ED PROVIDER NOTE - PHYSICAL EXAMINATION
LLE:  +no foot edema; no calf swelling; no calf tenderness; no palpable cords; 2+ pulses; < 2 sec cap refill; 5/5 strength; sensation intact to light touch

## 2024-02-02 NOTE — ED ADULT TRIAGE NOTE - CHIEF COMPLAINT QUOTE
Pt presents after sustaining closed fracture to Lt distal fibula, currently has injury casted and wrapped in ace bandage, pt has been having difficulty finding podiatrist/orthopedic appointment and seeking further eval here in our ED.
